# Patient Record
Sex: FEMALE | Race: WHITE | Employment: OTHER | URBAN - METROPOLITAN AREA
[De-identification: names, ages, dates, MRNs, and addresses within clinical notes are randomized per-mention and may not be internally consistent; named-entity substitution may affect disease eponyms.]

---

## 2017-01-11 PROBLEM — M06.4 INFLAMMATORY POLYARTHRITIS (HCC): Chronic | Status: ACTIVE | Noted: 2017-01-11

## 2017-01-11 PROBLEM — A41.9 SEPSIS (HCC): Status: ACTIVE | Noted: 2017-01-11

## 2018-05-17 ENCOUNTER — HOSPITAL ENCOUNTER (OUTPATIENT)
Age: 83
Discharge: HOME | End: 2018-05-19
Payer: MEDICARE

## 2018-05-17 LAB — RHEUMATOID FACTOR: 14 IU/ML (ref 0–13)

## 2018-05-17 PROCEDURE — 82787 IGG 1 2 3 OR 4 EACH: CPT

## 2018-05-17 PROCEDURE — 84165 PROTEIN E-PHORESIS SERUM: CPT

## 2018-05-17 PROCEDURE — 36415 COLL VENOUS BLD VENIPUNCTURE: CPT

## 2018-05-17 PROCEDURE — 82784 ASSAY IGA/IGD/IGG/IGM EACH: CPT

## 2018-05-17 PROCEDURE — 86431 RHEUMATOID FACTOR QUANT: CPT

## 2018-05-17 PROCEDURE — 86334 IMMUNOFIX E-PHORESIS SERUM: CPT

## 2018-05-17 PROCEDURE — 85651 RBC SED RATE NONAUTOMATED: CPT

## 2018-05-18 LAB — SEDIMENTATION RATE, ERYTHROCYTE: 76 MM/HR (ref 0–20)

## 2018-05-19 LAB
IGG 1: 447 MG/DL (ref 240–1118)
IGG 2: 464 MG/DL (ref 124–549)
IGG 3: 52 MG/DL (ref 21–134)
IGG 4: 163 MG/DL (ref 1–123)

## 2018-05-20 LAB
ALBUMIN SERPL-MCNC: 3 G/DL (ref 3.5–4.7)
ALPHA-1-GLOBULIN: 0.4 G/DL (ref 0.2–0.4)
ALPHA-2-GLOBULIN: 1 G/FL (ref 0.5–1)
BETA GLOBULIN: 1.1 G/DL (ref 0.8–1.3)
ELECTROPHORESIS: ABNORMAL
GAMMA GLOBULIN: 1.4 G/DL (ref 0.7–1.6)
IGM: 96 MG/DL (ref 40–230)
IMMUNOFIXATION RESULT, SERUM: NORMAL
TOTAL PROTEIN: 6.9 G/DL (ref 6.4–8.3)

## 2018-05-22 LAB
IGA 1: 404 MG/DL (ref 60–294)
IGA 2: 46 MG/DL (ref 6–61)
IGA: 464 MG/DL (ref 68–408)

## 2018-10-18 ENCOUNTER — HOSPITAL ENCOUNTER (OUTPATIENT)
Age: 83
Discharge: HOME | End: 2018-10-20
Payer: MEDICARE

## 2018-10-18 LAB
BASOPHILS ABSOLUTE: 0.03 E9/L (ref 0–0.2)
BASOPHILS RELATIVE PERCENT: 0.3 % (ref 0–2)
EOSINOPHILS ABSOLUTE: 0.03 E9/L (ref 0.05–0.5)
EOSINOPHILS RELATIVE PERCENT: 0.3 % (ref 0–6)
HCT VFR BLD CALC: 40.2 % (ref 34–48)
HEMOGLOBIN: 12.3 G/DL (ref 11.5–15.5)
IMMATURE GRANULOCYTES #: 0.1 E9/L
IMMATURE GRANULOCYTES %: 0.9 % (ref 0–5)
LYMPHOCYTES ABSOLUTE: 1.32 E9/L (ref 1.5–4)
LYMPHOCYTES RELATIVE PERCENT: 11.3 % (ref 20–42)
MCH RBC QN AUTO: 27.9 PG (ref 26–35)
MCHC RBC AUTO-ENTMCNC: 30.6 % (ref 32–34.5)
MCV RBC AUTO: 91.2 FL (ref 80–99.9)
MONOCYTES ABSOLUTE: 0.62 E9/L (ref 0.1–0.95)
MONOCYTES RELATIVE PERCENT: 5.3 % (ref 2–12)
NEUTROPHILS ABSOLUTE: 9.59 E9/L (ref 1.8–7.3)
NEUTROPHILS RELATIVE PERCENT: 81.9 % (ref 43–80)
PDW BLD-RTO: 15.2 FL (ref 11.5–15)
PLATELET # BLD: 320 E9/L (ref 130–450)
PMV BLD AUTO: 9 FL (ref 7–12)
RBC # BLD: 4.41 E12/L (ref 3.5–5.5)
WBC # BLD: 11.7 E9/L (ref 4.5–11.5)

## 2018-10-18 PROCEDURE — 85025 COMPLETE CBC W/AUTO DIFF WBC: CPT

## 2018-10-18 PROCEDURE — 36415 COLL VENOUS BLD VENIPUNCTURE: CPT

## 2018-10-18 PROCEDURE — 82787 IGG 1 2 3 OR 4 EACH: CPT

## 2018-10-18 PROCEDURE — 84165 PROTEIN E-PHORESIS SERUM: CPT

## 2018-10-18 PROCEDURE — 86334 IMMUNOFIX E-PHORESIS SERUM: CPT

## 2018-10-19 LAB
ALBUMIN SERPL-MCNC: 3.1 G/DL (ref 3.5–4.7)
ALPHA-1-GLOBULIN: 0.4 G/DL (ref 0.2–0.4)
ALPHA-2-GLOBULIN: 0.9 G/FL (ref 0.5–1)
BETA GLOBULIN: 1.2 G/DL (ref 0.8–1.3)
ELECTROPHORESIS: ABNORMAL
GAMMA GLOBULIN: 1.4 G/DL (ref 0.7–1.6)
IMMUNOFIXATION RESULT, SERUM: NORMAL
TOTAL PROTEIN: 7 G/DL (ref 6.4–8.3)

## 2018-10-20 LAB
IGG 1: 499 MG/DL (ref 240–1118)
IGG 2: 495 MG/DL (ref 124–549)
IGG 3: 53 MG/DL (ref 21–134)
IGG 4: 217 MG/DL (ref 1–123)

## 2019-02-21 ENCOUNTER — HOSPITAL ENCOUNTER (OUTPATIENT)
Age: 84
Discharge: HOME | End: 2019-02-23
Payer: MEDICARE

## 2019-02-21 LAB
ALBUMIN SERPL-MCNC: 2.9 G/DL (ref 3.5–5.2)
ALP BLD-CCNC: 67 U/L (ref 35–104)
ALT SERPL-CCNC: 9 U/L (ref 0–32)
ANION GAP SERPL CALCULATED.3IONS-SCNC: 13 MMOL/L (ref 7–16)
AST SERPL-CCNC: 14 U/L (ref 0–31)
BASOPHILS ABSOLUTE: 0.04 E9/L (ref 0–0.2)
BASOPHILS RELATIVE PERCENT: 0.3 % (ref 0–2)
BILIRUB SERPL-MCNC: 0.5 MG/DL (ref 0–1.2)
BUN BLDV-MCNC: 15 MG/DL (ref 8–23)
C-REACTIVE PROTEIN: 16 MG/DL (ref 0–0.4)
CALCIUM SERPL-MCNC: 8.9 MG/DL (ref 8.6–10.2)
CHLORIDE BLD-SCNC: 92 MMOL/L (ref 98–107)
CO2: 28 MMOL/L (ref 22–29)
CREAT SERPL-MCNC: 0.7 MG/DL (ref 0.5–1)
EOSINOPHILS ABSOLUTE: 0.1 E9/L (ref 0.05–0.5)
EOSINOPHILS RELATIVE PERCENT: 0.8 % (ref 0–6)
GFR AFRICAN AMERICAN: >60
GFR NON-AFRICAN AMERICAN: >60 ML/MIN/1.73
GLUCOSE BLD-MCNC: 138 MG/DL (ref 74–99)
HCT VFR BLD CALC: 39 % (ref 34–48)
HEMOGLOBIN: 12.1 G/DL (ref 11.5–15.5)
IMMATURE GRANULOCYTES #: 0.21 E9/L
IMMATURE GRANULOCYTES %: 1.6 % (ref 0–5)
LYMPHOCYTES ABSOLUTE: 1.08 E9/L (ref 1.5–4)
LYMPHOCYTES RELATIVE PERCENT: 8.2 % (ref 20–42)
MCH RBC QN AUTO: 28.2 PG (ref 26–35)
MCHC RBC AUTO-ENTMCNC: 31 % (ref 32–34.5)
MCV RBC AUTO: 90.9 FL (ref 80–99.9)
MONOCYTES ABSOLUTE: 0.68 E9/L (ref 0.1–0.95)
MONOCYTES RELATIVE PERCENT: 5.1 % (ref 2–12)
NEUTROPHILS ABSOLUTE: 11.12 E9/L (ref 1.8–7.3)
NEUTROPHILS RELATIVE PERCENT: 84 % (ref 43–80)
PDW BLD-RTO: 13.5 FL (ref 11.5–15)
PLATELET # BLD: 517 E9/L (ref 130–450)
PMV BLD AUTO: 9 FL (ref 7–12)
POTASSIUM SERPL-SCNC: 3.9 MMOL/L (ref 3.5–5)
RBC # BLD: 4.29 E12/L (ref 3.5–5.5)
SODIUM BLD-SCNC: 133 MMOL/L (ref 132–146)
WBC # BLD: 13.2 E9/L (ref 4.5–11.5)

## 2019-02-21 PROCEDURE — 84165 PROTEIN E-PHORESIS SERUM: CPT

## 2019-02-21 PROCEDURE — 82787 IGG 1 2 3 OR 4 EACH: CPT

## 2019-02-21 PROCEDURE — 86334 IMMUNOFIX E-PHORESIS SERUM: CPT

## 2019-02-21 PROCEDURE — 80053 COMPREHEN METABOLIC PANEL: CPT

## 2019-02-21 PROCEDURE — 85651 RBC SED RATE NONAUTOMATED: CPT

## 2019-02-21 PROCEDURE — 82784 ASSAY IGA/IGD/IGG/IGM EACH: CPT

## 2019-02-21 PROCEDURE — 36415 COLL VENOUS BLD VENIPUNCTURE: CPT

## 2019-02-21 PROCEDURE — 86140 C-REACTIVE PROTEIN: CPT

## 2019-02-21 PROCEDURE — 85025 COMPLETE CBC W/AUTO DIFF WBC: CPT

## 2019-02-22 LAB — SEDIMENTATION RATE, ERYTHROCYTE: 136 MM/HR (ref 0–20)

## 2019-02-23 ENCOUNTER — APPOINTMENT (OUTPATIENT)
Dept: CT IMAGING | Age: 84
DRG: 178 | End: 2019-02-23
Payer: MEDICARE

## 2019-02-23 ENCOUNTER — HOSPITAL ENCOUNTER (INPATIENT)
Age: 84
LOS: 13 days | Discharge: NURSING, REHAB OR LONG-TERM CARE | DRG: 178 | End: 2019-03-08
Attending: EMERGENCY MEDICINE | Admitting: INTERNAL MEDICINE
Payer: MEDICARE

## 2019-02-23 ENCOUNTER — APPOINTMENT (OUTPATIENT)
Dept: GENERAL RADIOLOGY | Age: 84
DRG: 178 | End: 2019-02-23
Payer: MEDICARE

## 2019-02-23 DIAGNOSIS — R53.83 FATIGUE, UNSPECIFIED TYPE: ICD-10-CM

## 2019-02-23 DIAGNOSIS — R05.9 COUGH: ICD-10-CM

## 2019-02-23 DIAGNOSIS — R91.8 MASS OF LOWER LOBE OF LEFT LUNG: Primary | ICD-10-CM

## 2019-02-23 DIAGNOSIS — E87.1 HYPONATREMIA: ICD-10-CM

## 2019-02-23 DIAGNOSIS — R91.8 RIGHT LOWER LOBE LUNG MASS: ICD-10-CM

## 2019-02-23 LAB
ALBUMIN SERPL-MCNC: 2.7 G/DL (ref 3.5–5.2)
ALP BLD-CCNC: 79 U/L (ref 35–104)
ALT SERPL-CCNC: 9 U/L (ref 0–32)
AMORPHOUS: ABNORMAL
ANION GAP SERPL CALCULATED.3IONS-SCNC: 10 MMOL/L (ref 7–16)
AST SERPL-CCNC: 12 U/L (ref 0–31)
ATYPICAL LYMPHOCYTE RELATIVE PERCENT: 1 % (ref 0–4)
BACTERIA: ABNORMAL /HPF
BASOPHILS ABSOLUTE: 0 E9/L (ref 0–0.2)
BASOPHILS RELATIVE PERCENT: 0 % (ref 0–2)
BILIRUB SERPL-MCNC: 0.4 MG/DL (ref 0–1.2)
BILIRUBIN URINE: NEGATIVE
BLOOD, URINE: ABNORMAL
BUN BLDV-MCNC: 12 MG/DL (ref 8–23)
CALCIUM SERPL-MCNC: 9.2 MG/DL (ref 8.6–10.2)
CHLORIDE BLD-SCNC: 90 MMOL/L (ref 98–107)
CLARITY: ABNORMAL
CO2: 31 MMOL/L (ref 22–29)
COLOR: YELLOW
CREAT SERPL-MCNC: 0.6 MG/DL (ref 0.5–1)
CRYSTALS, UA: ABNORMAL
EOSINOPHILS ABSOLUTE: 0.12 E9/L (ref 0.05–0.5)
EOSINOPHILS RELATIVE PERCENT: 1 % (ref 0–6)
EPITHELIAL CELLS, UA: ABNORMAL /HPF
GFR AFRICAN AMERICAN: >60
GFR NON-AFRICAN AMERICAN: >60 ML/MIN/1.73
GLUCOSE BLD-MCNC: 107 MG/DL (ref 74–99)
GLUCOSE URINE: NEGATIVE MG/DL
HCT VFR BLD CALC: 36.1 % (ref 34–48)
HEMOGLOBIN: 11.3 G/DL (ref 11.5–15.5)
IGG 1: 402 MG/DL (ref 240–1118)
IGG 2: 416 MG/DL (ref 124–549)
IGG 3: 31 MG/DL (ref 21–134)
IGG 4: 205 MG/DL (ref 1–123)
INFLUENZA A BY PCR: NOT DETECTED
INFLUENZA B BY PCR: NOT DETECTED
INR BLD: 1.7
KETONES, URINE: NEGATIVE MG/DL
LACTIC ACID: 2.6 MMOL/L (ref 0.5–2.2)
LEUKOCYTE ESTERASE, URINE: ABNORMAL
LIPASE: 30 U/L (ref 13–60)
LYMPHOCYTES ABSOLUTE: 0.12 E9/L (ref 1.5–4)
LYMPHOCYTES RELATIVE PERCENT: 0 % (ref 20–42)
MCH RBC QN AUTO: 28.5 PG (ref 26–35)
MCHC RBC AUTO-ENTMCNC: 31.3 % (ref 32–34.5)
MCV RBC AUTO: 91.2 FL (ref 80–99.9)
MONOCYTES ABSOLUTE: 0.47 E9/L (ref 0.1–0.95)
MONOCYTES RELATIVE PERCENT: 4 % (ref 2–12)
MUCUS: PRESENT
NEUTROPHILS ABSOLUTE: 11 E9/L (ref 1.8–7.3)
NEUTROPHILS RELATIVE PERCENT: 94 % (ref 43–80)
NITRITE, URINE: NEGATIVE
OSMOLALITY URINE: 402 MOSM/KG (ref 300–900)
PDW BLD-RTO: 13.4 FL (ref 11.5–15)
PH UA: 6 (ref 5–9)
PLATELET # BLD: 431 E9/L (ref 130–450)
PMV BLD AUTO: 8.7 FL (ref 7–12)
POTASSIUM REFLEX MAGNESIUM: 4.5 MMOL/L (ref 3.5–5)
PRO-BNP: 1120 PG/ML (ref 0–450)
PROTEIN UA: NEGATIVE MG/DL
PROTHROMBIN TIME: 19.8 SEC (ref 9.3–12.4)
RBC # BLD: 3.96 E12/L (ref 3.5–5.5)
RBC # BLD: NORMAL 10*6/UL
RBC UA: ABNORMAL /HPF (ref 0–2)
RENAL EPITHELIAL, UA: ABNORMAL /HPF
SODIUM BLD-SCNC: 131 MMOL/L (ref 132–146)
SODIUM URINE: <20 MMOL/L
SPECIFIC GRAVITY UA: <=1.005 (ref 1–1.03)
TOTAL PROTEIN: 6.8 G/DL (ref 6.4–8.3)
TROPONIN: <0.01 NG/ML (ref 0–0.03)
TSH SERPL DL<=0.05 MIU/L-ACNC: 2.67 UIU/ML (ref 0.27–4.2)
UROBILINOGEN, URINE: 2 E.U./DL
WBC # BLD: 11.7 E9/L (ref 4.5–11.5)
WBC UA: ABNORMAL /HPF (ref 0–5)

## 2019-02-23 PROCEDURE — 84484 ASSAY OF TROPONIN QUANT: CPT

## 2019-02-23 PROCEDURE — 71046 X-RAY EXAM CHEST 2 VIEWS: CPT

## 2019-02-23 PROCEDURE — 81001 URINALYSIS AUTO W/SCOPE: CPT

## 2019-02-23 PROCEDURE — 85025 COMPLETE CBC W/AUTO DIFF WBC: CPT

## 2019-02-23 PROCEDURE — 87081 CULTURE SCREEN ONLY: CPT

## 2019-02-23 PROCEDURE — 6370000000 HC RX 637 (ALT 250 FOR IP): Performed by: INTERNAL MEDICINE

## 2019-02-23 PROCEDURE — 80053 COMPREHEN METABOLIC PANEL: CPT

## 2019-02-23 PROCEDURE — 84300 ASSAY OF URINE SODIUM: CPT

## 2019-02-23 PROCEDURE — 87633 RESP VIRUS 12-25 TARGETS: CPT

## 2019-02-23 PROCEDURE — 83605 ASSAY OF LACTIC ACID: CPT

## 2019-02-23 PROCEDURE — 85610 PROTHROMBIN TIME: CPT

## 2019-02-23 PROCEDURE — 87088 URINE BACTERIA CULTURE: CPT

## 2019-02-23 PROCEDURE — 83935 ASSAY OF URINE OSMOLALITY: CPT

## 2019-02-23 PROCEDURE — 6360000002 HC RX W HCPCS: Performed by: INTERNAL MEDICINE

## 2019-02-23 PROCEDURE — 71250 CT THORAX DX C-: CPT

## 2019-02-23 PROCEDURE — 36415 COLL VENOUS BLD VENIPUNCTURE: CPT

## 2019-02-23 PROCEDURE — 99285 EMERGENCY DEPT VISIT HI MDM: CPT

## 2019-02-23 PROCEDURE — 2580000003 HC RX 258

## 2019-02-23 PROCEDURE — 87581 M.PNEUMON DNA AMP PROBE: CPT

## 2019-02-23 PROCEDURE — 87486 CHLMYD PNEUM DNA AMP PROBE: CPT

## 2019-02-23 PROCEDURE — 87502 INFLUENZA DNA AMP PROBE: CPT

## 2019-02-23 PROCEDURE — 2580000003 HC RX 258: Performed by: EMERGENCY MEDICINE

## 2019-02-23 PROCEDURE — 87798 DETECT AGENT NOS DNA AMP: CPT

## 2019-02-23 PROCEDURE — 84443 ASSAY THYROID STIM HORMONE: CPT

## 2019-02-23 PROCEDURE — 83880 ASSAY OF NATRIURETIC PEPTIDE: CPT

## 2019-02-23 PROCEDURE — 2580000003 HC RX 258: Performed by: INTERNAL MEDICINE

## 2019-02-23 PROCEDURE — 1200000000 HC SEMI PRIVATE

## 2019-02-23 PROCEDURE — 87040 BLOOD CULTURE FOR BACTERIA: CPT

## 2019-02-23 PROCEDURE — 83690 ASSAY OF LIPASE: CPT

## 2019-02-23 RX ORDER — 0.9 % SODIUM CHLORIDE 0.9 %
500 INTRAVENOUS SOLUTION INTRAVENOUS ONCE
Status: COMPLETED | OUTPATIENT
Start: 2019-02-23 | End: 2019-02-23

## 2019-02-23 RX ORDER — SODIUM CHLORIDE 9 MG/ML
INJECTION, SOLUTION INTRAVENOUS CONTINUOUS
Status: DISCONTINUED | OUTPATIENT
Start: 2019-02-23 | End: 2019-02-25

## 2019-02-23 RX ORDER — ACETAMINOPHEN 500 MG
500 TABLET ORAL DAILY
Status: DISCONTINUED | OUTPATIENT
Start: 2019-02-23 | End: 2019-03-08 | Stop reason: HOSPADM

## 2019-02-23 RX ORDER — CODEINE PHOSPHATE AND GUAIFENESIN 10; 100 MG/5ML; MG/5ML
10 SOLUTION ORAL EVERY 6 HOURS PRN
Status: DISCONTINUED | OUTPATIENT
Start: 2019-02-23 | End: 2019-03-08 | Stop reason: HOSPADM

## 2019-02-23 RX ORDER — CHOLECALCIFEROL (VITAMIN D3) 50 MCG
2000 TABLET ORAL DAILY
Status: DISCONTINUED | OUTPATIENT
Start: 2019-02-23 | End: 2019-03-08 | Stop reason: HOSPADM

## 2019-02-23 RX ORDER — AMOXICILLIN 500 MG/1
500 CAPSULE ORAL 3 TIMES DAILY
Status: ON HOLD | COMMUNITY
End: 2019-02-23

## 2019-02-23 RX ORDER — METOPROLOL SUCCINATE 25 MG/1
25 TABLET, EXTENDED RELEASE ORAL DAILY
Status: DISCONTINUED | OUTPATIENT
Start: 2019-02-23 | End: 2019-03-08 | Stop reason: HOSPADM

## 2019-02-23 RX ORDER — PREDNISONE 1 MG/1
2.5 TABLET ORAL DAILY
Status: DISCONTINUED | OUTPATIENT
Start: 2019-02-23 | End: 2019-03-01

## 2019-02-23 RX ORDER — LEVOTHYROXINE SODIUM 0.05 MG/1
50 TABLET ORAL DAILY
Status: DISCONTINUED | OUTPATIENT
Start: 2019-02-24 | End: 2019-03-08 | Stop reason: HOSPADM

## 2019-02-23 RX ORDER — SODIUM CHLORIDE 0.9 % (FLUSH) 0.9 %
SYRINGE (ML) INJECTION
Status: COMPLETED
Start: 2019-02-23 | End: 2019-02-23

## 2019-02-23 RX ORDER — LIDOCAINE HYDROCHLORIDE 10 MG/ML
5 INJECTION, SOLUTION EPIDURAL; INFILTRATION; INTRACAUDAL; PERINEURAL ONCE
Status: DISCONTINUED | OUTPATIENT
Start: 2019-02-23 | End: 2019-03-08 | Stop reason: HOSPADM

## 2019-02-23 RX ADMIN — GUAIFENESIN AND CODEINE PHOSPHATE 10 ML: 10; 100 LIQUID ORAL at 22:10

## 2019-02-23 RX ADMIN — SODIUM CHLORIDE: 9 INJECTION, SOLUTION INTRAVENOUS at 22:09

## 2019-02-23 RX ADMIN — VANCOMYCIN HYDROCHLORIDE 1000 MG: 1 INJECTION, POWDER, LYOPHILIZED, FOR SOLUTION INTRAVENOUS at 22:40

## 2019-02-23 RX ADMIN — Medication 10 ML: at 16:11

## 2019-02-23 RX ADMIN — SODIUM CHLORIDE 500 ML: 9 INJECTION, SOLUTION INTRAVENOUS at 16:31

## 2019-02-23 RX ADMIN — APIXABAN 2.5 MG: 2.5 TABLET, FILM COATED ORAL at 22:09

## 2019-02-23 RX ADMIN — CEFTRIAXONE 1 G: 1 INJECTION, POWDER, FOR SOLUTION INTRAMUSCULAR; INTRAVENOUS at 22:10

## 2019-02-23 ASSESSMENT — ENCOUNTER SYMPTOMS
DIARRHEA: 0
STRIDOR: 0
WHEEZING: 0
NAUSEA: 0
COUGH: 1
COLOR CHANGE: 0
CHOKING: 0
VOICE CHANGE: 0
EYE DISCHARGE: 0
HEMATOCHEZIA: 0
TROUBLE SWALLOWING: 0
VOMITING: 0
RHINORRHEA: 0
CHEST TIGHTNESS: 0
SHORTNESS OF BREATH: 0
ABDOMINAL DISTENTION: 0
SORE THROAT: 0
EYE ITCHING: 0
SINUS PRESSURE: 0
EYE REDNESS: 0
ABDOMINAL PAIN: 0

## 2019-02-23 ASSESSMENT — PAIN SCALES - GENERAL: PAINLEVEL_OUTOF10: 0

## 2019-02-24 ENCOUNTER — APPOINTMENT (OUTPATIENT)
Dept: CT IMAGING | Age: 84
DRG: 178 | End: 2019-02-24
Payer: MEDICARE

## 2019-02-24 LAB
ANION GAP SERPL CALCULATED.3IONS-SCNC: 12 MMOL/L (ref 7–16)
BASOPHILS ABSOLUTE: 0.02 E9/L (ref 0–0.2)
BASOPHILS RELATIVE PERCENT: 0.2 % (ref 0–2)
BUN BLDV-MCNC: 8 MG/DL (ref 8–23)
CALCIUM SERPL-MCNC: 8 MG/DL (ref 8.6–10.2)
CHLORIDE BLD-SCNC: 96 MMOL/L (ref 98–107)
CO2: 26 MMOL/L (ref 22–29)
CREAT SERPL-MCNC: 0.6 MG/DL (ref 0.5–1)
EOSINOPHILS ABSOLUTE: 0.12 E9/L (ref 0.05–0.5)
EOSINOPHILS RELATIVE PERCENT: 1.2 % (ref 0–6)
FILM ARRAY ADENOVIRUS: NORMAL
FILM ARRAY BORDETELLA PERTUSSIS: NORMAL
FILM ARRAY CHLAMYDOPHILIA PNEUMONIAE: NORMAL
FILM ARRAY CORONAVIRUS 229E: NORMAL
FILM ARRAY CORONAVIRUS HKU1: NORMAL
FILM ARRAY CORONAVIRUS NL63: NORMAL
FILM ARRAY CORONAVIRUS OC43: NORMAL
FILM ARRAY INFLUENZA A VIRUS 09H1: NORMAL
FILM ARRAY INFLUENZA A VIRUS H1: NORMAL
FILM ARRAY INFLUENZA A VIRUS H3: NORMAL
FILM ARRAY INFLUENZA A VIRUS: NORMAL
FILM ARRAY INFLUENZA B: NORMAL
FILM ARRAY METAPNEUMOVIRUS: NORMAL
FILM ARRAY MYCOPLASMA PNEUMONIAE: NORMAL
FILM ARRAY PARAINFLUENZA VIRUS 1: NORMAL
FILM ARRAY PARAINFLUENZA VIRUS 2: NORMAL
FILM ARRAY PARAINFLUENZA VIRUS 3: NORMAL
FILM ARRAY PARAINFLUENZA VIRUS 4: NORMAL
FILM ARRAY RESPIRATORY SYNCITIAL VIRUS: NORMAL
FILM ARRAY RHINOVIRUS/ENTEROVIRUS: NORMAL
GFR AFRICAN AMERICAN: >60
GFR NON-AFRICAN AMERICAN: >60 ML/MIN/1.73
GLUCOSE BLD-MCNC: 116 MG/DL (ref 74–99)
HCT VFR BLD CALC: 32.2 % (ref 34–48)
HEMOGLOBIN: 10.3 G/DL (ref 11.5–15.5)
IMMATURE GRANULOCYTES #: 0.16 E9/L
IMMATURE GRANULOCYTES %: 1.7 % (ref 0–5)
LYMPHOCYTES ABSOLUTE: 0.61 E9/L (ref 1.5–4)
LYMPHOCYTES RELATIVE PERCENT: 6.3 % (ref 20–42)
MCH RBC QN AUTO: 28.7 PG (ref 26–35)
MCHC RBC AUTO-ENTMCNC: 32 % (ref 32–34.5)
MCV RBC AUTO: 89.7 FL (ref 80–99.9)
METER GLUCOSE: 162 MG/DL (ref 74–99)
MONOCYTES ABSOLUTE: 0.73 E9/L (ref 0.1–0.95)
MONOCYTES RELATIVE PERCENT: 7.6 % (ref 2–12)
NEUTROPHILS ABSOLUTE: 8.02 E9/L (ref 1.8–7.3)
NEUTROPHILS RELATIVE PERCENT: 83 % (ref 43–80)
OSMOLALITY URINE: 211 MOSM/KG (ref 300–900)
OSMOLALITY: 271 MOSM/KG (ref 285–310)
OSMOLALITY: 283 MOSM/KG (ref 285–310)
PDW BLD-RTO: 13.5 FL (ref 11.5–15)
PLATELET # BLD: 403 E9/L (ref 130–450)
PMV BLD AUTO: 8.9 FL (ref 7–12)
POTASSIUM SERPL-SCNC: 3.5 MMOL/L (ref 3.5–5)
PROCALCITONIN: 0.06 NG/ML (ref 0–0.08)
RBC # BLD: 3.59 E12/L (ref 3.5–5.5)
SEDIMENTATION RATE, ERYTHROCYTE: 105 MM/HR (ref 0–20)
SODIUM BLD-SCNC: 134 MMOL/L (ref 132–146)
SODIUM URINE: <20 MMOL/L
WBC # BLD: 9.7 E9/L (ref 4.5–11.5)

## 2019-02-24 PROCEDURE — 1200000000 HC SEMI PRIVATE

## 2019-02-24 PROCEDURE — 84145 PROCALCITONIN (PCT): CPT

## 2019-02-24 PROCEDURE — 87449 NOS EACH ORGANISM AG IA: CPT

## 2019-02-24 PROCEDURE — 2580000003 HC RX 258: Performed by: INTERNAL MEDICINE

## 2019-02-24 PROCEDURE — 82962 GLUCOSE BLOOD TEST: CPT

## 2019-02-24 PROCEDURE — 87450 HC DIRECT STREP B ANTIGEN: CPT

## 2019-02-24 PROCEDURE — 86255 FLUORESCENT ANTIBODY SCREEN: CPT

## 2019-02-24 PROCEDURE — 2580000003 HC RX 258: Performed by: SPECIALIST

## 2019-02-24 PROCEDURE — 36415 COLL VENOUS BLD VENIPUNCTURE: CPT

## 2019-02-24 PROCEDURE — 83930 ASSAY OF BLOOD OSMOLALITY: CPT

## 2019-02-24 PROCEDURE — 6360000002 HC RX W HCPCS: Performed by: SPECIALIST

## 2019-02-24 PROCEDURE — 87305 ASPERGILLUS AG IA: CPT

## 2019-02-24 PROCEDURE — 83516 IMMUNOASSAY NONANTIBODY: CPT

## 2019-02-24 PROCEDURE — 6370000000 HC RX 637 (ALT 250 FOR IP): Performed by: INTERNAL MEDICINE

## 2019-02-24 PROCEDURE — 70486 CT MAXILLOFACIAL W/O DYE: CPT

## 2019-02-24 PROCEDURE — 80048 BASIC METABOLIC PNL TOTAL CA: CPT

## 2019-02-24 PROCEDURE — 83935 ASSAY OF URINE OSMOLALITY: CPT

## 2019-02-24 PROCEDURE — 84300 ASSAY OF URINE SODIUM: CPT

## 2019-02-24 PROCEDURE — 85025 COMPLETE CBC W/AUTO DIFF WBC: CPT

## 2019-02-24 PROCEDURE — 85651 RBC SED RATE NONAUTOMATED: CPT

## 2019-02-24 RX ORDER — ACETAMINOPHEN 325 MG/1
650 TABLET ORAL EVERY 6 HOURS PRN
Status: DISCONTINUED | OUTPATIENT
Start: 2019-02-24 | End: 2019-03-08 | Stop reason: HOSPADM

## 2019-02-24 RX ADMIN — CHOLECALCIFEROL TAB 50 MCG (2000 UNIT) 2000 UNITS: 50 TAB at 09:08

## 2019-02-24 RX ADMIN — GUAIFENESIN AND CODEINE PHOSPHATE 10 ML: 10; 100 LIQUID ORAL at 12:06

## 2019-02-24 RX ADMIN — SODIUM CHLORIDE: 9 INJECTION, SOLUTION INTRAVENOUS at 20:16

## 2019-02-24 RX ADMIN — LEVOTHYROXINE SODIUM 50 MCG: 50 TABLET ORAL at 09:08

## 2019-02-24 RX ADMIN — APIXABAN 2.5 MG: 2.5 TABLET, FILM COATED ORAL at 09:08

## 2019-02-24 RX ADMIN — AMPICILLIN SODIUM AND SULBACTAM SODIUM 3 G: 2; 1 INJECTION, POWDER, FOR SOLUTION INTRAMUSCULAR; INTRAVENOUS at 16:33

## 2019-02-24 RX ADMIN — APIXABAN 2.5 MG: 2.5 TABLET, FILM COATED ORAL at 20:16

## 2019-02-24 RX ADMIN — SODIUM CHLORIDE: 9 INJECTION, SOLUTION INTRAVENOUS at 09:13

## 2019-02-24 RX ADMIN — ACETAMINOPHEN 650 MG: 325 TABLET ORAL at 21:49

## 2019-02-24 RX ADMIN — METOPROLOL SUCCINATE 25 MG: 25 TABLET, EXTENDED RELEASE ORAL at 09:08

## 2019-02-24 RX ADMIN — AMPICILLIN SODIUM AND SULBACTAM SODIUM 3 G: 2; 1 INJECTION, POWDER, FOR SOLUTION INTRAMUSCULAR; INTRAVENOUS at 21:53

## 2019-02-24 RX ADMIN — PREDNISONE 2.5 MG: 5 TABLET ORAL at 09:08

## 2019-02-24 RX ADMIN — ACETAMINOPHEN 500 MG: 500 TABLET ORAL at 09:08

## 2019-02-24 ASSESSMENT — PAIN SCALES - GENERAL
PAINLEVEL_OUTOF10: 0
PAINLEVEL_OUTOF10: 0
PAINLEVEL_OUTOF10: 3
PAINLEVEL_OUTOF10: 0
PAINLEVEL_OUTOF10: 0

## 2019-02-24 ASSESSMENT — ENCOUNTER SYMPTOMS
ABDOMINAL PAIN: 0
COUGH: 1
EYE PAIN: 0
SHORTNESS OF BREATH: 0
SORE THROAT: 0
ABDOMINAL DISTENTION: 0
CHEST TIGHTNESS: 0
EYE DISCHARGE: 0
RHINORRHEA: 1
EYE ITCHING: 0
TROUBLE SWALLOWING: 0

## 2019-02-24 ASSESSMENT — PAIN - FUNCTIONAL ASSESSMENT: PAIN_FUNCTIONAL_ASSESSMENT: ACTIVITIES ARE NOT PREVENTED

## 2019-02-24 ASSESSMENT — PAIN DESCRIPTION - DESCRIPTORS: DESCRIPTORS: HEADACHE

## 2019-02-24 ASSESSMENT — PAIN DESCRIPTION - PAIN TYPE: TYPE: ACUTE PAIN

## 2019-02-24 ASSESSMENT — PAIN DESCRIPTION - FREQUENCY: FREQUENCY: INTERMITTENT

## 2019-02-24 ASSESSMENT — PAIN DESCRIPTION - LOCATION: LOCATION: HEAD

## 2019-02-24 ASSESSMENT — PAIN DESCRIPTION - ONSET: ONSET: GRADUAL

## 2019-02-24 ASSESSMENT — PAIN DESCRIPTION - PROGRESSION: CLINICAL_PROGRESSION: GRADUALLY WORSENING

## 2019-02-25 ENCOUNTER — APPOINTMENT (OUTPATIENT)
Dept: GENERAL RADIOLOGY | Age: 84
DRG: 178 | End: 2019-02-25
Payer: MEDICARE

## 2019-02-25 LAB
ALBUMIN SERPL-MCNC: 2.4 G/DL (ref 3.5–4.7)
ALPHA-1-GLOBULIN: 0.6 G/DL (ref 0.2–0.4)
ALPHA-2-GLOBULIN: 1.1 G/FL (ref 0.5–1)
ANION GAP SERPL CALCULATED.3IONS-SCNC: 8 MMOL/L (ref 7–16)
BETA GLOBULIN: 1.3 G/DL (ref 0.8–1.3)
BUN BLDV-MCNC: 7 MG/DL (ref 8–23)
CALCIUM SERPL-MCNC: 8.2 MG/DL (ref 8.6–10.2)
CHLORIDE BLD-SCNC: 102 MMOL/L (ref 98–107)
CO2: 27 MMOL/L (ref 22–29)
CREAT SERPL-MCNC: 0.6 MG/DL (ref 0.5–1)
ELECTROPHORESIS: ABNORMAL
GAMMA GLOBULIN: 1.1 G/DL (ref 0.7–1.6)
GFR AFRICAN AMERICAN: >60
GFR NON-AFRICAN AMERICAN: >60 ML/MIN/1.73
GLUCOSE BLD-MCNC: 109 MG/DL (ref 74–99)
IGA: 555 MG/DL (ref 70–400)
IGM: 75 MG/DL (ref 40–230)
IMMUNOFIXATION RESULT, SERUM: NORMAL
L. PNEUMOPHILA SEROGP 1 UR AG: NORMAL
MRSA CULTURE ONLY: NORMAL
POTASSIUM SERPL-SCNC: 3.6 MMOL/L (ref 3.5–5)
SEDIMENTATION RATE, ERYTHROCYTE: 110 MM/HR (ref 0–20)
SODIUM BLD-SCNC: 137 MMOL/L (ref 132–146)
STREP PNEUMONIAE ANTIGEN, URINE: NORMAL
TOTAL PROTEIN: 6.4 G/DL (ref 6.4–8.3)
URINE CULTURE, ROUTINE: NORMAL

## 2019-02-25 PROCEDURE — 36415 COLL VENOUS BLD VENIPUNCTURE: CPT

## 2019-02-25 PROCEDURE — 85651 RBC SED RATE NONAUTOMATED: CPT

## 2019-02-25 PROCEDURE — 2580000003 HC RX 258: Performed by: SPECIALIST

## 2019-02-25 PROCEDURE — 92611 MOTION FLUOROSCOPY/SWALLOW: CPT | Performed by: SPEECH-LANGUAGE PATHOLOGIST

## 2019-02-25 PROCEDURE — 6370000000 HC RX 637 (ALT 250 FOR IP): Performed by: INTERNAL MEDICINE

## 2019-02-25 PROCEDURE — 97165 OT EVAL LOW COMPLEX 30 MIN: CPT

## 2019-02-25 PROCEDURE — 74230 X-RAY XM SWLNG FUNCJ C+: CPT

## 2019-02-25 PROCEDURE — 2580000003 HC RX 258: Performed by: INTERNAL MEDICINE

## 2019-02-25 PROCEDURE — 6360000002 HC RX W HCPCS: Performed by: SPECIALIST

## 2019-02-25 PROCEDURE — 1200000000 HC SEMI PRIVATE

## 2019-02-25 PROCEDURE — 80048 BASIC METABOLIC PNL TOTAL CA: CPT

## 2019-02-25 PROCEDURE — 2500000003 HC RX 250 WO HCPCS: Performed by: RADIOLOGY

## 2019-02-25 RX ORDER — SODIUM CHLORIDE 9 MG/ML
INJECTION, SOLUTION INTRAVENOUS CONTINUOUS
Status: ACTIVE | OUTPATIENT
Start: 2019-02-25 | End: 2019-02-26

## 2019-02-25 RX ADMIN — BARIUM SULFATE 45 G: 0.6 CREAM ORAL at 11:22

## 2019-02-25 RX ADMIN — ACETAMINOPHEN 500 MG: 500 TABLET ORAL at 10:25

## 2019-02-25 RX ADMIN — METOPROLOL SUCCINATE 25 MG: 25 TABLET, EXTENDED RELEASE ORAL at 10:24

## 2019-02-25 RX ADMIN — BARIUM SULFATE 88 G: 960 POWDER, FOR SUSPENSION ORAL at 11:22

## 2019-02-25 RX ADMIN — SODIUM CHLORIDE: 9 INJECTION, SOLUTION INTRAVENOUS at 10:58

## 2019-02-25 RX ADMIN — APIXABAN 2.5 MG: 2.5 TABLET, FILM COATED ORAL at 21:30

## 2019-02-25 RX ADMIN — AMPICILLIN SODIUM AND SULBACTAM SODIUM 3 G: 2; 1 INJECTION, POWDER, FOR SOLUTION INTRAMUSCULAR; INTRAVENOUS at 06:09

## 2019-02-25 RX ADMIN — AMPICILLIN SODIUM AND SULBACTAM SODIUM 3 G: 2; 1 INJECTION, POWDER, FOR SOLUTION INTRAMUSCULAR; INTRAVENOUS at 22:46

## 2019-02-25 RX ADMIN — GUAIFENESIN AND CODEINE PHOSPHATE 10 ML: 10; 100 LIQUID ORAL at 18:28

## 2019-02-25 RX ADMIN — AMPICILLIN SODIUM AND SULBACTAM SODIUM 3 G: 2; 1 INJECTION, POWDER, FOR SOLUTION INTRAMUSCULAR; INTRAVENOUS at 10:29

## 2019-02-25 RX ADMIN — PREDNISONE 2.5 MG: 5 TABLET ORAL at 10:24

## 2019-02-25 RX ADMIN — CHOLECALCIFEROL TAB 50 MCG (2000 UNIT) 2000 UNITS: 50 TAB at 10:24

## 2019-02-25 RX ADMIN — APIXABAN 2.5 MG: 2.5 TABLET, FILM COATED ORAL at 10:24

## 2019-02-25 RX ADMIN — AMPICILLIN SODIUM AND SULBACTAM SODIUM 3 G: 2; 1 INJECTION, POWDER, FOR SOLUTION INTRAMUSCULAR; INTRAVENOUS at 17:07

## 2019-02-25 RX ADMIN — SODIUM CHLORIDE: 9 INJECTION, SOLUTION INTRAVENOUS at 17:07

## 2019-02-25 RX ADMIN — LEVOTHYROXINE SODIUM 50 MCG: 50 TABLET ORAL at 06:09

## 2019-02-25 RX ADMIN — ACETAMINOPHEN 650 MG: 325 TABLET ORAL at 06:09

## 2019-02-25 ASSESSMENT — PAIN SCALES - GENERAL
PAINLEVEL_OUTOF10: 3
PAINLEVEL_OUTOF10: 0

## 2019-02-26 PROBLEM — J69.0 ASPIRATION PNEUMONIA (HCC): Status: ACTIVE | Noted: 2019-02-26

## 2019-02-26 PROBLEM — A41.9 SEPSIS (HCC): Status: RESOLVED | Noted: 2017-01-11 | Resolved: 2019-02-26

## 2019-02-26 LAB
ANION GAP SERPL CALCULATED.3IONS-SCNC: 8 MMOL/L (ref 7–16)
ASPERGILLUS GALACTO AG: NEGATIVE
ASPERGILLUS GALACTO INDEX: 0.03
BUN BLDV-MCNC: 5 MG/DL (ref 8–23)
CALCIUM SERPL-MCNC: 7.9 MG/DL (ref 8.6–10.2)
CHLORIDE BLD-SCNC: 100 MMOL/L (ref 98–107)
CO2: 27 MMOL/L (ref 22–29)
CREAT SERPL-MCNC: 0.5 MG/DL (ref 0.5–1)
GFR AFRICAN AMERICAN: >60
GFR NON-AFRICAN AMERICAN: >60 ML/MIN/1.73
GLUCOSE BLD-MCNC: 116 MG/DL (ref 74–99)
POTASSIUM SERPL-SCNC: 3.3 MMOL/L (ref 3.5–5)
SODIUM BLD-SCNC: 135 MMOL/L (ref 132–146)

## 2019-02-26 PROCEDURE — 36415 COLL VENOUS BLD VENIPUNCTURE: CPT

## 2019-02-26 PROCEDURE — 1200000000 HC SEMI PRIVATE

## 2019-02-26 PROCEDURE — 2580000003 HC RX 258

## 2019-02-26 PROCEDURE — 80048 BASIC METABOLIC PNL TOTAL CA: CPT

## 2019-02-26 PROCEDURE — 97161 PT EVAL LOW COMPLEX 20 MIN: CPT

## 2019-02-26 PROCEDURE — 6370000000 HC RX 637 (ALT 250 FOR IP): Performed by: INTERNAL MEDICINE

## 2019-02-26 PROCEDURE — 97530 THERAPEUTIC ACTIVITIES: CPT

## 2019-02-26 PROCEDURE — 2580000003 HC RX 258: Performed by: INTERNAL MEDICINE

## 2019-02-26 PROCEDURE — 6360000002 HC RX W HCPCS: Performed by: SPECIALIST

## 2019-02-26 PROCEDURE — 2580000003 HC RX 258: Performed by: SPECIALIST

## 2019-02-26 RX ORDER — POTASSIUM CHLORIDE 20 MEQ/1
20 TABLET, EXTENDED RELEASE ORAL 2 TIMES DAILY WITH MEALS
Status: COMPLETED | OUTPATIENT
Start: 2019-02-26 | End: 2019-02-26

## 2019-02-26 RX ORDER — SODIUM CHLORIDE 0.9 % (FLUSH) 0.9 %
SYRINGE (ML) INJECTION
Status: COMPLETED
Start: 2019-02-26 | End: 2019-02-26

## 2019-02-26 RX ADMIN — PREDNISONE 2.5 MG: 5 TABLET ORAL at 08:20

## 2019-02-26 RX ADMIN — APIXABAN 2.5 MG: 2.5 TABLET, FILM COATED ORAL at 08:21

## 2019-02-26 RX ADMIN — SODIUM CHLORIDE, PRESERVATIVE FREE: 5 INJECTION INTRAVENOUS at 10:15

## 2019-02-26 RX ADMIN — APIXABAN 2.5 MG: 2.5 TABLET, FILM COATED ORAL at 22:15

## 2019-02-26 RX ADMIN — LEVOTHYROXINE SODIUM 50 MCG: 50 TABLET ORAL at 06:17

## 2019-02-26 RX ADMIN — AMPICILLIN SODIUM AND SULBACTAM SODIUM 3 G: 2; 1 INJECTION, POWDER, FOR SOLUTION INTRAMUSCULAR; INTRAVENOUS at 16:45

## 2019-02-26 RX ADMIN — METOPROLOL SUCCINATE 25 MG: 25 TABLET, EXTENDED RELEASE ORAL at 08:20

## 2019-02-26 RX ADMIN — AMPICILLIN SODIUM AND SULBACTAM SODIUM 3 G: 2; 1 INJECTION, POWDER, FOR SOLUTION INTRAMUSCULAR; INTRAVENOUS at 22:15

## 2019-02-26 RX ADMIN — POTASSIUM CHLORIDE 20 MEQ: 20 TABLET, EXTENDED RELEASE ORAL at 16:45

## 2019-02-26 RX ADMIN — CHOLECALCIFEROL TAB 50 MCG (2000 UNIT) 2000 UNITS: 50 TAB at 08:19

## 2019-02-26 RX ADMIN — ACETAMINOPHEN 500 MG: 500 TABLET ORAL at 08:19

## 2019-02-26 RX ADMIN — GUAIFENESIN AND CODEINE PHOSPHATE 10 ML: 10; 100 LIQUID ORAL at 13:47

## 2019-02-26 RX ADMIN — AMPICILLIN SODIUM AND SULBACTAM SODIUM 3 G: 2; 1 INJECTION, POWDER, FOR SOLUTION INTRAMUSCULAR; INTRAVENOUS at 10:51

## 2019-02-26 RX ADMIN — AMPICILLIN SODIUM AND SULBACTAM SODIUM 3 G: 2; 1 INJECTION, POWDER, FOR SOLUTION INTRAMUSCULAR; INTRAVENOUS at 04:21

## 2019-02-26 RX ADMIN — POTASSIUM CHLORIDE 20 MEQ: 20 TABLET, EXTENDED RELEASE ORAL at 08:21

## 2019-02-26 RX ADMIN — SODIUM CHLORIDE: 9 INJECTION, SOLUTION INTRAVENOUS at 04:13

## 2019-02-26 ASSESSMENT — PAIN DESCRIPTION - PAIN TYPE: TYPE: CHRONIC PAIN

## 2019-02-26 ASSESSMENT — PAIN - FUNCTIONAL ASSESSMENT
PAIN_FUNCTIONAL_ASSESSMENT: PREVENTS OR INTERFERES SOME ACTIVE ACTIVITIES AND ADLS
PAIN_FUNCTIONAL_ASSESSMENT: 0-10

## 2019-02-26 ASSESSMENT — PAIN DESCRIPTION - DESCRIPTORS: DESCRIPTORS: ACHING

## 2019-02-26 ASSESSMENT — PAIN SCALES - GENERAL
PAINLEVEL_OUTOF10: 2
PAINLEVEL_OUTOF10: 0

## 2019-02-26 ASSESSMENT — PAIN DESCRIPTION - LOCATION: LOCATION: GENERALIZED

## 2019-02-27 LAB
(1,3)-BETA-D-GLUCAN (FUNGITELL) INTERPRETATION: POSITIVE
(1,3)-BETA-D-GLUCAN (FUNGITELL): 105 PG/ML
ANION GAP SERPL CALCULATED.3IONS-SCNC: 7 MMOL/L (ref 7–16)
BUN BLDV-MCNC: 9 MG/DL (ref 8–23)
CALCIUM SERPL-MCNC: 8.3 MG/DL (ref 8.6–10.2)
CHLORIDE BLD-SCNC: 100 MMOL/L (ref 98–107)
CO2: 30 MMOL/L (ref 22–29)
CREAT SERPL-MCNC: 0.5 MG/DL (ref 0.5–1)
GFR AFRICAN AMERICAN: >60
GFR NON-AFRICAN AMERICAN: >60 ML/MIN/1.73
GLUCOSE BLD-MCNC: 130 MG/DL (ref 74–99)
HCT VFR BLD CALC: 30.4 % (ref 34–48)
HEMOGLOBIN: 9.9 G/DL (ref 11.5–15.5)
MCH RBC QN AUTO: 29.1 PG (ref 26–35)
MCHC RBC AUTO-ENTMCNC: 32.6 % (ref 32–34.5)
MCV RBC AUTO: 89.4 FL (ref 80–99.9)
PDW BLD-RTO: 13.9 FL (ref 11.5–15)
PLATELET # BLD: 391 E9/L (ref 130–450)
PMV BLD AUTO: 8.8 FL (ref 7–12)
POTASSIUM SERPL-SCNC: 4.4 MMOL/L (ref 3.5–5)
RBC # BLD: 3.4 E12/L (ref 3.5–5.5)
SODIUM BLD-SCNC: 137 MMOL/L (ref 132–146)
WBC # BLD: 9.8 E9/L (ref 4.5–11.5)

## 2019-02-27 PROCEDURE — 2580000003 HC RX 258: Performed by: SPECIALIST

## 2019-02-27 PROCEDURE — 83516 IMMUNOASSAY NONANTIBODY: CPT

## 2019-02-27 PROCEDURE — 85027 COMPLETE CBC AUTOMATED: CPT

## 2019-02-27 PROCEDURE — 1200000000 HC SEMI PRIVATE

## 2019-02-27 PROCEDURE — 2580000003 HC RX 258

## 2019-02-27 PROCEDURE — 6370000000 HC RX 637 (ALT 250 FOR IP): Performed by: CLINICAL NURSE SPECIALIST

## 2019-02-27 PROCEDURE — 6360000002 HC RX W HCPCS: Performed by: SPECIALIST

## 2019-02-27 PROCEDURE — 97530 THERAPEUTIC ACTIVITIES: CPT

## 2019-02-27 PROCEDURE — 86039 ANTINUCLEAR ANTIBODIES (ANA): CPT

## 2019-02-27 PROCEDURE — 86255 FLUORESCENT ANTIBODY SCREEN: CPT

## 2019-02-27 PROCEDURE — 2580000003 HC RX 258: Performed by: INTERNAL MEDICINE

## 2019-02-27 PROCEDURE — 36415 COLL VENOUS BLD VENIPUNCTURE: CPT

## 2019-02-27 PROCEDURE — 92526 ORAL FUNCTION THERAPY: CPT | Performed by: SPEECH-LANGUAGE PATHOLOGIST

## 2019-02-27 PROCEDURE — 6370000000 HC RX 637 (ALT 250 FOR IP): Performed by: INTERNAL MEDICINE

## 2019-02-27 PROCEDURE — 80048 BASIC METABOLIC PNL TOTAL CA: CPT

## 2019-02-27 PROCEDURE — 86038 ANTINUCLEAR ANTIBODIES: CPT

## 2019-02-27 RX ORDER — SODIUM CHLORIDE 0.9 % (FLUSH) 0.9 %
SYRINGE (ML) INJECTION
Status: COMPLETED
Start: 2019-02-27 | End: 2019-02-27

## 2019-02-27 RX ORDER — SODIUM CHLORIDE 0.9 % (FLUSH) 0.9 %
10 SYRINGE (ML) INJECTION 2 TIMES DAILY
Status: DISCONTINUED | OUTPATIENT
Start: 2019-02-27 | End: 2019-03-08 | Stop reason: HOSPADM

## 2019-02-27 RX ORDER — FLUTICASONE PROPIONATE 50 MCG
1 SPRAY, SUSPENSION (ML) NASAL DAILY
Status: DISCONTINUED | OUTPATIENT
Start: 2019-02-27 | End: 2019-03-08 | Stop reason: HOSPADM

## 2019-02-27 RX ADMIN — Medication 10 ML: at 12:57

## 2019-02-27 RX ADMIN — LEVOTHYROXINE SODIUM 50 MCG: 50 TABLET ORAL at 05:01

## 2019-02-27 RX ADMIN — CHOLECALCIFEROL TAB 50 MCG (2000 UNIT) 2000 UNITS: 50 TAB at 08:32

## 2019-02-27 RX ADMIN — AMPICILLIN SODIUM AND SULBACTAM SODIUM 3 G: 2; 1 INJECTION, POWDER, FOR SOLUTION INTRAMUSCULAR; INTRAVENOUS at 16:28

## 2019-02-27 RX ADMIN — APIXABAN 2.5 MG: 2.5 TABLET, FILM COATED ORAL at 08:31

## 2019-02-27 RX ADMIN — AMPICILLIN SODIUM AND SULBACTAM SODIUM 3 G: 2; 1 INJECTION, POWDER, FOR SOLUTION INTRAMUSCULAR; INTRAVENOUS at 05:00

## 2019-02-27 RX ADMIN — SODIUM CHLORIDE, PRESERVATIVE FREE: 5 INJECTION INTRAVENOUS at 11:40

## 2019-02-27 RX ADMIN — FLUTICASONE PROPIONATE 1 SPRAY: 50 SPRAY, METERED NASAL at 12:55

## 2019-02-27 RX ADMIN — ACETAMINOPHEN 500 MG: 500 TABLET ORAL at 10:26

## 2019-02-27 RX ADMIN — PREDNISONE 2.5 MG: 5 TABLET ORAL at 08:32

## 2019-02-27 RX ADMIN — Medication 10 ML: at 11:00

## 2019-02-27 RX ADMIN — METOPROLOL SUCCINATE 25 MG: 25 TABLET, EXTENDED RELEASE ORAL at 08:31

## 2019-02-27 RX ADMIN — AMPICILLIN SODIUM AND SULBACTAM SODIUM 3 G: 2; 1 INJECTION, POWDER, FOR SOLUTION INTRAMUSCULAR; INTRAVENOUS at 21:57

## 2019-02-27 RX ADMIN — GUAIFENESIN AND CODEINE PHOSPHATE 10 ML: 10; 100 LIQUID ORAL at 16:28

## 2019-02-27 RX ADMIN — AMPICILLIN SODIUM AND SULBACTAM SODIUM 3 G: 2; 1 INJECTION, POWDER, FOR SOLUTION INTRAMUSCULAR; INTRAVENOUS at 10:27

## 2019-02-27 RX ADMIN — APIXABAN 2.5 MG: 2.5 TABLET, FILM COATED ORAL at 20:48

## 2019-02-27 RX ADMIN — Medication 10 ML: at 20:49

## 2019-02-27 ASSESSMENT — PAIN SCALES - GENERAL
PAINLEVEL_OUTOF10: 0

## 2019-02-28 LAB
ANCA IFA: ABNORMAL
ANION GAP SERPL CALCULATED.3IONS-SCNC: 11 MMOL/L (ref 7–16)
BLOOD CULTURE, ROUTINE: NORMAL
BUN BLDV-MCNC: 7 MG/DL (ref 8–23)
CALCIUM SERPL-MCNC: 7.8 MG/DL (ref 8.6–10.2)
CHLORIDE BLD-SCNC: 94 MMOL/L (ref 98–107)
CO2: 29 MMOL/L (ref 22–29)
CREAT SERPL-MCNC: 0.5 MG/DL (ref 0.5–1)
CULTURE, BLOOD 2: NORMAL
EKG ATRIAL RATE: 96 BPM
EKG P AXIS: 34 DEGREES
EKG P-R INTERVAL: 160 MS
EKG Q-T INTERVAL: 364 MS
EKG QRS DURATION: 78 MS
EKG QTC CALCULATION (BAZETT): 459 MS
EKG R AXIS: -12 DEGREES
EKG T AXIS: 14 DEGREES
EKG VENTRICULAR RATE: 96 BPM
GFR AFRICAN AMERICAN: >60
GFR NON-AFRICAN AMERICAN: >60 ML/MIN/1.73
GLUCOSE BLD-MCNC: 114 MG/DL (ref 74–99)
HCT VFR BLD CALC: 31.2 % (ref 34–48)
HEMOGLOBIN: 9.8 G/DL (ref 11.5–15.5)
MCH RBC QN AUTO: 28.2 PG (ref 26–35)
MCHC RBC AUTO-ENTMCNC: 31.4 % (ref 32–34.5)
MCV RBC AUTO: 89.7 FL (ref 80–99.9)
PDW BLD-RTO: 13.9 FL (ref 11.5–15)
PLATELET # BLD: 368 E9/L (ref 130–450)
PMV BLD AUTO: 8.7 FL (ref 7–12)
POTASSIUM SERPL-SCNC: 3.6 MMOL/L (ref 3.5–5)
RBC # BLD: 3.48 E12/L (ref 3.5–5.5)
SODIUM BLD-SCNC: 134 MMOL/L (ref 132–146)
WBC # BLD: 9.7 E9/L (ref 4.5–11.5)

## 2019-02-28 PROCEDURE — 6370000000 HC RX 637 (ALT 250 FOR IP): Performed by: SPECIALIST

## 2019-02-28 PROCEDURE — 36415 COLL VENOUS BLD VENIPUNCTURE: CPT

## 2019-02-28 PROCEDURE — 2580000003 HC RX 258: Performed by: SPECIALIST

## 2019-02-28 PROCEDURE — 1200000000 HC SEMI PRIVATE

## 2019-02-28 PROCEDURE — 85027 COMPLETE CBC AUTOMATED: CPT

## 2019-02-28 PROCEDURE — 6370000000 HC RX 637 (ALT 250 FOR IP): Performed by: INTERNAL MEDICINE

## 2019-02-28 PROCEDURE — 80048 BASIC METABOLIC PNL TOTAL CA: CPT

## 2019-02-28 PROCEDURE — 92526 ORAL FUNCTION THERAPY: CPT | Performed by: SPEECH-LANGUAGE PATHOLOGIST

## 2019-02-28 PROCEDURE — 2580000003 HC RX 258: Performed by: INTERNAL MEDICINE

## 2019-02-28 PROCEDURE — 97535 SELF CARE MNGMENT TRAINING: CPT

## 2019-02-28 PROCEDURE — 97530 THERAPEUTIC ACTIVITIES: CPT

## 2019-02-28 PROCEDURE — 6360000002 HC RX W HCPCS: Performed by: SPECIALIST

## 2019-02-28 RX ORDER — ITRACONAZOLE 100 MG/1
200 CAPSULE ORAL 2 TIMES DAILY
Status: DISCONTINUED | OUTPATIENT
Start: 2019-02-28 | End: 2019-03-08 | Stop reason: HOSPADM

## 2019-02-28 RX ADMIN — AMPICILLIN SODIUM AND SULBACTAM SODIUM 3 G: 2; 1 INJECTION, POWDER, FOR SOLUTION INTRAMUSCULAR; INTRAVENOUS at 05:00

## 2019-02-28 RX ADMIN — LEVOTHYROXINE SODIUM 50 MCG: 50 TABLET ORAL at 05:02

## 2019-02-28 RX ADMIN — AMPICILLIN SODIUM AND SULBACTAM SODIUM 3 G: 2; 1 INJECTION, POWDER, FOR SOLUTION INTRAMUSCULAR; INTRAVENOUS at 22:05

## 2019-02-28 RX ADMIN — CHOLECALCIFEROL TAB 50 MCG (2000 UNIT) 2000 UNITS: 50 TAB at 10:23

## 2019-02-28 RX ADMIN — FLUTICASONE PROPIONATE 1 SPRAY: 50 SPRAY, METERED NASAL at 10:22

## 2019-02-28 RX ADMIN — APIXABAN 2.5 MG: 2.5 TABLET, FILM COATED ORAL at 10:24

## 2019-02-28 RX ADMIN — ACETAMINOPHEN 500 MG: 500 TABLET ORAL at 10:22

## 2019-02-28 RX ADMIN — PREDNISONE 2.5 MG: 5 TABLET ORAL at 10:23

## 2019-02-28 RX ADMIN — AMPICILLIN SODIUM AND SULBACTAM SODIUM 3 G: 2; 1 INJECTION, POWDER, FOR SOLUTION INTRAMUSCULAR; INTRAVENOUS at 16:49

## 2019-02-28 RX ADMIN — Medication 10 ML: at 20:43

## 2019-02-28 RX ADMIN — Medication 10 ML: at 10:27

## 2019-02-28 RX ADMIN — APIXABAN 2.5 MG: 2.5 TABLET, FILM COATED ORAL at 20:41

## 2019-02-28 RX ADMIN — ITRACONAZOLE 200 MG: 100 CAPSULE, COATED PELLETS ORAL at 14:20

## 2019-02-28 RX ADMIN — METOPROLOL SUCCINATE 25 MG: 25 TABLET, EXTENDED RELEASE ORAL at 10:24

## 2019-02-28 RX ADMIN — AMPICILLIN SODIUM AND SULBACTAM SODIUM 3 G: 2; 1 INJECTION, POWDER, FOR SOLUTION INTRAMUSCULAR; INTRAVENOUS at 10:39

## 2019-02-28 RX ADMIN — ITRACONAZOLE 200 MG: 100 CAPSULE, COATED PELLETS ORAL at 20:41

## 2019-02-28 ASSESSMENT — PAIN SCALES - GENERAL
PAINLEVEL_OUTOF10: 0
PAINLEVEL_OUTOF10: 6

## 2019-03-01 LAB
ANA PATTERN: ABNORMAL
ANA TITER: ABNORMAL
ANION GAP SERPL CALCULATED.3IONS-SCNC: 9 MMOL/L (ref 7–16)
ANTI-NUCLEAR ANTIBODY (ANA): POSITIVE
BUN BLDV-MCNC: 8 MG/DL (ref 8–23)
CALCIUM SERPL-MCNC: 7.8 MG/DL (ref 8.6–10.2)
CHLORIDE BLD-SCNC: 96 MMOL/L (ref 98–107)
CO2: 31 MMOL/L (ref 22–29)
CREAT SERPL-MCNC: 0.5 MG/DL (ref 0.5–1)
GFR AFRICAN AMERICAN: >60
GFR NON-AFRICAN AMERICAN: >60 ML/MIN/1.73
GLUCOSE BLD-MCNC: 110 MG/DL (ref 74–99)
HCT VFR BLD CALC: 29.7 % (ref 34–48)
HEMOGLOBIN: 9.5 G/DL (ref 11.5–15.5)
MCH RBC QN AUTO: 28.6 PG (ref 26–35)
MCHC RBC AUTO-ENTMCNC: 32 % (ref 32–34.5)
MCV RBC AUTO: 89.5 FL (ref 80–99.9)
MYELOPEROXIDASE AB: 0 AU/ML (ref 0–19)
PDW BLD-RTO: 13.9 FL (ref 11.5–15)
PLATELET # BLD: 387 E9/L (ref 130–450)
PMV BLD AUTO: 8.7 FL (ref 7–12)
POTASSIUM SERPL-SCNC: 3.5 MMOL/L (ref 3.5–5)
RBC # BLD: 3.32 E12/L (ref 3.5–5.5)
SERINE PROTEASE 3 AB: 389 AU/ML (ref 0–19)
SODIUM BLD-SCNC: 136 MMOL/L (ref 132–146)
WBC # BLD: 9.7 E9/L (ref 4.5–11.5)

## 2019-03-01 PROCEDURE — 87070 CULTURE OTHR SPECIMN AEROBIC: CPT

## 2019-03-01 PROCEDURE — 2580000003 HC RX 258: Performed by: SPECIALIST

## 2019-03-01 PROCEDURE — 6360000002 HC RX W HCPCS: Performed by: SPECIALIST

## 2019-03-01 PROCEDURE — 6360000002 HC RX W HCPCS: Performed by: INTERNAL MEDICINE

## 2019-03-01 PROCEDURE — 36415 COLL VENOUS BLD VENIPUNCTURE: CPT

## 2019-03-01 PROCEDURE — 97530 THERAPEUTIC ACTIVITIES: CPT

## 2019-03-01 PROCEDURE — 87206 SMEAR FLUORESCENT/ACID STAI: CPT

## 2019-03-01 PROCEDURE — 1200000000 HC SEMI PRIVATE

## 2019-03-01 PROCEDURE — 6370000000 HC RX 637 (ALT 250 FOR IP): Performed by: SPECIALIST

## 2019-03-01 PROCEDURE — 6370000000 HC RX 637 (ALT 250 FOR IP): Performed by: INTERNAL MEDICINE

## 2019-03-01 PROCEDURE — 85027 COMPLETE CBC AUTOMATED: CPT

## 2019-03-01 PROCEDURE — 2580000003 HC RX 258: Performed by: INTERNAL MEDICINE

## 2019-03-01 PROCEDURE — 97535 SELF CARE MNGMENT TRAINING: CPT

## 2019-03-01 PROCEDURE — 92526 ORAL FUNCTION THERAPY: CPT | Performed by: SPEECH-LANGUAGE PATHOLOGIST

## 2019-03-01 PROCEDURE — 80048 BASIC METABOLIC PNL TOTAL CA: CPT

## 2019-03-01 RX ORDER — METHYLPREDNISOLONE SODIUM SUCCINATE 40 MG/ML
40 INJECTION, POWDER, LYOPHILIZED, FOR SOLUTION INTRAMUSCULAR; INTRAVENOUS EVERY 12 HOURS
Status: DISCONTINUED | OUTPATIENT
Start: 2019-03-01 | End: 2019-03-03

## 2019-03-01 RX ADMIN — LEVOTHYROXINE SODIUM 50 MCG: 50 TABLET ORAL at 05:22

## 2019-03-01 RX ADMIN — AMPICILLIN SODIUM AND SULBACTAM SODIUM 3 G: 2; 1 INJECTION, POWDER, FOR SOLUTION INTRAMUSCULAR; INTRAVENOUS at 11:01

## 2019-03-01 RX ADMIN — ACETAMINOPHEN 500 MG: 500 TABLET ORAL at 09:26

## 2019-03-01 RX ADMIN — METHYLPREDNISOLONE SODIUM SUCCINATE 40 MG: 40 INJECTION, POWDER, FOR SOLUTION INTRAMUSCULAR; INTRAVENOUS at 17:13

## 2019-03-01 RX ADMIN — Medication 10 ML: at 09:26

## 2019-03-01 RX ADMIN — FLUTICASONE PROPIONATE 1 SPRAY: 50 SPRAY, METERED NASAL at 09:26

## 2019-03-01 RX ADMIN — ITRACONAZOLE 200 MG: 100 CAPSULE, COATED PELLETS ORAL at 21:48

## 2019-03-01 RX ADMIN — AMPICILLIN SODIUM AND SULBACTAM SODIUM 3 G: 2; 1 INJECTION, POWDER, FOR SOLUTION INTRAMUSCULAR; INTRAVENOUS at 05:22

## 2019-03-01 RX ADMIN — Medication 10 ML: at 21:48

## 2019-03-01 RX ADMIN — APIXABAN 2.5 MG: 2.5 TABLET, FILM COATED ORAL at 09:26

## 2019-03-01 RX ADMIN — ITRACONAZOLE 200 MG: 100 CAPSULE, COATED PELLETS ORAL at 09:26

## 2019-03-01 RX ADMIN — CHOLECALCIFEROL TAB 50 MCG (2000 UNIT) 2000 UNITS: 50 TAB at 09:26

## 2019-03-01 RX ADMIN — METOPROLOL SUCCINATE 25 MG: 25 TABLET, EXTENDED RELEASE ORAL at 09:26

## 2019-03-01 RX ADMIN — AMPICILLIN SODIUM AND SULBACTAM SODIUM 3 G: 2; 1 INJECTION, POWDER, FOR SOLUTION INTRAMUSCULAR; INTRAVENOUS at 17:13

## 2019-03-01 RX ADMIN — PREDNISONE 2.5 MG: 5 TABLET ORAL at 09:26

## 2019-03-01 RX ADMIN — AMPICILLIN SODIUM AND SULBACTAM SODIUM 3 G: 2; 1 INJECTION, POWDER, FOR SOLUTION INTRAMUSCULAR; INTRAVENOUS at 23:18

## 2019-03-01 ASSESSMENT — PAIN SCALES - GENERAL
PAINLEVEL_OUTOF10: 0
PAINLEVEL_OUTOF10: 0
PAINLEVEL_OUTOF10: 2
PAINLEVEL_OUTOF10: 0

## 2019-03-02 LAB
ANION GAP SERPL CALCULATED.3IONS-SCNC: 11 MMOL/L (ref 7–16)
BUN BLDV-MCNC: 15 MG/DL (ref 8–23)
CALCIUM SERPL-MCNC: 8.4 MG/DL (ref 8.6–10.2)
CHLORIDE BLD-SCNC: 95 MMOL/L (ref 98–107)
CO2: 32 MMOL/L (ref 22–29)
CREAT SERPL-MCNC: 0.5 MG/DL (ref 0.5–1)
GFR AFRICAN AMERICAN: >60
GFR NON-AFRICAN AMERICAN: >60 ML/MIN/1.73
GLUCOSE BLD-MCNC: 193 MG/DL (ref 74–99)
HCT VFR BLD CALC: 31.2 % (ref 34–48)
HEMOGLOBIN: 9.9 G/DL (ref 11.5–15.5)
MCH RBC QN AUTO: 28.4 PG (ref 26–35)
MCHC RBC AUTO-ENTMCNC: 31.7 % (ref 32–34.5)
MCV RBC AUTO: 89.7 FL (ref 80–99.9)
METER GLUCOSE: 174 MG/DL (ref 74–99)
METER GLUCOSE: 184 MG/DL (ref 74–99)
METER GLUCOSE: 251 MG/DL (ref 74–99)
PDW BLD-RTO: 14 FL (ref 11.5–15)
PLATELET # BLD: 432 E9/L (ref 130–450)
PMV BLD AUTO: 8.9 FL (ref 7–12)
POTASSIUM SERPL-SCNC: 4.4 MMOL/L (ref 3.5–5)
RBC # BLD: 3.48 E12/L (ref 3.5–5.5)
SODIUM BLD-SCNC: 138 MMOL/L (ref 132–146)
WBC # BLD: 7.3 E9/L (ref 4.5–11.5)

## 2019-03-02 PROCEDURE — 6370000000 HC RX 637 (ALT 250 FOR IP): Performed by: SPECIALIST

## 2019-03-02 PROCEDURE — 2580000003 HC RX 258: Performed by: INTERNAL MEDICINE

## 2019-03-02 PROCEDURE — 6370000000 HC RX 637 (ALT 250 FOR IP): Performed by: INTERNAL MEDICINE

## 2019-03-02 PROCEDURE — 85027 COMPLETE CBC AUTOMATED: CPT

## 2019-03-02 PROCEDURE — 80048 BASIC METABOLIC PNL TOTAL CA: CPT

## 2019-03-02 PROCEDURE — 36415 COLL VENOUS BLD VENIPUNCTURE: CPT

## 2019-03-02 PROCEDURE — 2580000003 HC RX 258: Performed by: SPECIALIST

## 2019-03-02 PROCEDURE — 6360000002 HC RX W HCPCS: Performed by: SPECIALIST

## 2019-03-02 PROCEDURE — 82962 GLUCOSE BLOOD TEST: CPT

## 2019-03-02 PROCEDURE — 84156 ASSAY OF PROTEIN URINE: CPT

## 2019-03-02 PROCEDURE — 1200000000 HC SEMI PRIVATE

## 2019-03-02 PROCEDURE — 82570 ASSAY OF URINE CREATININE: CPT

## 2019-03-02 PROCEDURE — 6360000002 HC RX W HCPCS: Performed by: INTERNAL MEDICINE

## 2019-03-02 RX ORDER — DEXTROSE MONOHYDRATE 50 MG/ML
100 INJECTION, SOLUTION INTRAVENOUS PRN
Status: DISCONTINUED | OUTPATIENT
Start: 2019-03-02 | End: 2019-03-08 | Stop reason: HOSPADM

## 2019-03-02 RX ORDER — DEXTROSE MONOHYDRATE 25 G/50ML
12.5 INJECTION, SOLUTION INTRAVENOUS PRN
Status: DISCONTINUED | OUTPATIENT
Start: 2019-03-02 | End: 2019-03-08 | Stop reason: HOSPADM

## 2019-03-02 RX ORDER — NICOTINE POLACRILEX 4 MG
15 LOZENGE BUCCAL PRN
Status: DISCONTINUED | OUTPATIENT
Start: 2019-03-02 | End: 2019-03-08 | Stop reason: HOSPADM

## 2019-03-02 RX ADMIN — INSULIN LISPRO 1 UNITS: 100 INJECTION, SOLUTION INTRAVENOUS; SUBCUTANEOUS at 17:05

## 2019-03-02 RX ADMIN — METHYLPREDNISOLONE SODIUM SUCCINATE 40 MG: 40 INJECTION, POWDER, FOR SOLUTION INTRAMUSCULAR; INTRAVENOUS at 04:25

## 2019-03-02 RX ADMIN — AMPICILLIN SODIUM AND SULBACTAM SODIUM 3 G: 2; 1 INJECTION, POWDER, FOR SOLUTION INTRAMUSCULAR; INTRAVENOUS at 04:55

## 2019-03-02 RX ADMIN — INSULIN LISPRO 3 UNITS: 100 INJECTION, SOLUTION INTRAVENOUS; SUBCUTANEOUS at 11:30

## 2019-03-02 RX ADMIN — ITRACONAZOLE 200 MG: 100 CAPSULE, COATED PELLETS ORAL at 20:45

## 2019-03-02 RX ADMIN — ITRACONAZOLE 200 MG: 100 CAPSULE, COATED PELLETS ORAL at 08:15

## 2019-03-02 RX ADMIN — METOPROLOL SUCCINATE 25 MG: 25 TABLET, EXTENDED RELEASE ORAL at 08:17

## 2019-03-02 RX ADMIN — Medication 10 ML: at 08:15

## 2019-03-02 RX ADMIN — AMPICILLIN SODIUM AND SULBACTAM SODIUM 3 G: 2; 1 INJECTION, POWDER, FOR SOLUTION INTRAMUSCULAR; INTRAVENOUS at 17:06

## 2019-03-02 RX ADMIN — INSULIN LISPRO 1 UNITS: 100 INJECTION, SOLUTION INTRAVENOUS; SUBCUTANEOUS at 20:46

## 2019-03-02 RX ADMIN — LEVOTHYROXINE SODIUM 50 MCG: 50 TABLET ORAL at 06:08

## 2019-03-02 RX ADMIN — AMPICILLIN SODIUM AND SULBACTAM SODIUM 3 G: 2; 1 INJECTION, POWDER, FOR SOLUTION INTRAMUSCULAR; INTRAVENOUS at 11:31

## 2019-03-02 RX ADMIN — CHOLECALCIFEROL TAB 50 MCG (2000 UNIT) 2000 UNITS: 50 TAB at 08:15

## 2019-03-02 RX ADMIN — METHYLPREDNISOLONE SODIUM SUCCINATE 40 MG: 40 INJECTION, POWDER, FOR SOLUTION INTRAMUSCULAR; INTRAVENOUS at 17:04

## 2019-03-02 RX ADMIN — FLUTICASONE PROPIONATE 1 SPRAY: 50 SPRAY, METERED NASAL at 08:21

## 2019-03-02 RX ADMIN — Medication 10 ML: at 20:45

## 2019-03-02 RX ADMIN — AMPICILLIN SODIUM AND SULBACTAM SODIUM 3 G: 2; 1 INJECTION, POWDER, FOR SOLUTION INTRAMUSCULAR; INTRAVENOUS at 22:33

## 2019-03-02 RX ADMIN — ACETAMINOPHEN 500 MG: 500 TABLET ORAL at 08:16

## 2019-03-02 ASSESSMENT — PAIN SCALES - GENERAL
PAINLEVEL_OUTOF10: 0
PAINLEVEL_OUTOF10: 0

## 2019-03-03 LAB
ANION GAP SERPL CALCULATED.3IONS-SCNC: 9 MMOL/L (ref 7–16)
APTT: 26.3 SEC (ref 24.5–35.1)
BUN BLDV-MCNC: 21 MG/DL (ref 8–23)
CALCIUM SERPL-MCNC: 8.4 MG/DL (ref 8.6–10.2)
CHLORIDE BLD-SCNC: 95 MMOL/L (ref 98–107)
CO2: 35 MMOL/L (ref 22–29)
CREAT SERPL-MCNC: 0.5 MG/DL (ref 0.5–1)
CREATININE URINE: 98 MG/DL (ref 29–226)
CULTURE, RESPIRATORY: NORMAL
GFR AFRICAN AMERICAN: >60
GFR NON-AFRICAN AMERICAN: >60 ML/MIN/1.73
GLUCOSE BLD-MCNC: 161 MG/DL (ref 74–99)
HCT VFR BLD CALC: 27.9 % (ref 34–48)
HEMOGLOBIN: 9.1 G/DL (ref 11.5–15.5)
INR BLD: 1.2
MCH RBC QN AUTO: 29.4 PG (ref 26–35)
MCHC RBC AUTO-ENTMCNC: 32.6 % (ref 32–34.5)
MCV RBC AUTO: 90 FL (ref 80–99.9)
METER GLUCOSE: 160 MG/DL (ref 74–99)
METER GLUCOSE: 192 MG/DL (ref 74–99)
METER GLUCOSE: 199 MG/DL (ref 74–99)
METER GLUCOSE: 265 MG/DL (ref 74–99)
PDW BLD-RTO: 14 FL (ref 11.5–15)
PLATELET # BLD: 445 E9/L (ref 130–450)
PMV BLD AUTO: 9.2 FL (ref 7–12)
POTASSIUM SERPL-SCNC: 3.8 MMOL/L (ref 3.5–5)
PROTEIN PROTEIN: 53 MG/DL (ref 0–12)
PROTEIN/CREAT RATIO: 0.5
PROTEIN/CREAT RATIO: 0.5 (ref 0–0.2)
PROTHROMBIN TIME: 14.2 SEC (ref 9.3–12.4)
RBC # BLD: 3.1 E12/L (ref 3.5–5.5)
SMEAR, RESPIRATORY: NORMAL
SODIUM BLD-SCNC: 139 MMOL/L (ref 132–146)
WBC # BLD: 9.9 E9/L (ref 4.5–11.5)

## 2019-03-03 PROCEDURE — 1200000000 HC SEMI PRIVATE

## 2019-03-03 PROCEDURE — 82962 GLUCOSE BLOOD TEST: CPT

## 2019-03-03 PROCEDURE — 85027 COMPLETE CBC AUTOMATED: CPT

## 2019-03-03 PROCEDURE — 6370000000 HC RX 637 (ALT 250 FOR IP): Performed by: INTERNAL MEDICINE

## 2019-03-03 PROCEDURE — 6370000000 HC RX 637 (ALT 250 FOR IP): Performed by: SPECIALIST

## 2019-03-03 PROCEDURE — 80048 BASIC METABOLIC PNL TOTAL CA: CPT

## 2019-03-03 PROCEDURE — 6360000002 HC RX W HCPCS: Performed by: SPECIALIST

## 2019-03-03 PROCEDURE — 36415 COLL VENOUS BLD VENIPUNCTURE: CPT

## 2019-03-03 PROCEDURE — 2580000003 HC RX 258: Performed by: INTERNAL MEDICINE

## 2019-03-03 PROCEDURE — 85610 PROTHROMBIN TIME: CPT

## 2019-03-03 PROCEDURE — 2580000003 HC RX 258: Performed by: SPECIALIST

## 2019-03-03 PROCEDURE — 85730 THROMBOPLASTIN TIME PARTIAL: CPT

## 2019-03-03 PROCEDURE — 6360000002 HC RX W HCPCS: Performed by: INTERNAL MEDICINE

## 2019-03-03 RX ORDER — PREDNISONE 20 MG/1
40 TABLET ORAL DAILY
Status: DISCONTINUED | OUTPATIENT
Start: 2019-03-04 | End: 2019-03-03 | Stop reason: CLARIF

## 2019-03-03 RX ORDER — PREDNISONE 20 MG/1
40 TABLET ORAL DAILY
Status: COMPLETED | OUTPATIENT
Start: 2019-03-04 | End: 2019-03-06

## 2019-03-03 RX ORDER — PREDNISONE 20 MG/1
20 TABLET ORAL DAILY
Status: DISCONTINUED | OUTPATIENT
Start: 2019-03-10 | End: 2019-03-08 | Stop reason: HOSPADM

## 2019-03-03 RX ORDER — PREDNISONE 1 MG/1
10 TABLET ORAL DAILY
Status: DISCONTINUED | OUTPATIENT
Start: 2019-03-13 | End: 2019-03-08 | Stop reason: HOSPADM

## 2019-03-03 RX ADMIN — AMPICILLIN SODIUM AND SULBACTAM SODIUM 3 G: 2; 1 INJECTION, POWDER, FOR SOLUTION INTRAMUSCULAR; INTRAVENOUS at 22:37

## 2019-03-03 RX ADMIN — INSULIN LISPRO 1 UNITS: 100 INJECTION, SOLUTION INTRAVENOUS; SUBCUTANEOUS at 20:46

## 2019-03-03 RX ADMIN — FLUTICASONE PROPIONATE 1 SPRAY: 50 SPRAY, METERED NASAL at 08:01

## 2019-03-03 RX ADMIN — AMPICILLIN SODIUM AND SULBACTAM SODIUM 3 G: 2; 1 INJECTION, POWDER, FOR SOLUTION INTRAMUSCULAR; INTRAVENOUS at 16:31

## 2019-03-03 RX ADMIN — AMPICILLIN SODIUM AND SULBACTAM SODIUM 3 G: 2; 1 INJECTION, POWDER, FOR SOLUTION INTRAMUSCULAR; INTRAVENOUS at 11:09

## 2019-03-03 RX ADMIN — Medication 10 ML: at 08:04

## 2019-03-03 RX ADMIN — AMPICILLIN SODIUM AND SULBACTAM SODIUM 3 G: 2; 1 INJECTION, POWDER, FOR SOLUTION INTRAMUSCULAR; INTRAVENOUS at 04:42

## 2019-03-03 RX ADMIN — METOPROLOL SUCCINATE 25 MG: 25 TABLET, EXTENDED RELEASE ORAL at 08:07

## 2019-03-03 RX ADMIN — ACETAMINOPHEN 500 MG: 500 TABLET ORAL at 08:04

## 2019-03-03 RX ADMIN — INSULIN LISPRO 1 UNITS: 100 INJECTION, SOLUTION INTRAVENOUS; SUBCUTANEOUS at 08:01

## 2019-03-03 RX ADMIN — INSULIN LISPRO 1 UNITS: 100 INJECTION, SOLUTION INTRAVENOUS; SUBCUTANEOUS at 16:33

## 2019-03-03 RX ADMIN — GUAIFENESIN AND CODEINE PHOSPHATE 10 ML: 10; 100 LIQUID ORAL at 02:34

## 2019-03-03 RX ADMIN — GUAIFENESIN AND CODEINE PHOSPHATE 10 ML: 10; 100 LIQUID ORAL at 20:50

## 2019-03-03 RX ADMIN — ITRACONAZOLE 200 MG: 100 CAPSULE, COATED PELLETS ORAL at 08:03

## 2019-03-03 RX ADMIN — METHYLPREDNISOLONE SODIUM SUCCINATE 40 MG: 40 INJECTION, POWDER, FOR SOLUTION INTRAMUSCULAR; INTRAVENOUS at 04:42

## 2019-03-03 RX ADMIN — INSULIN LISPRO 3 UNITS: 100 INJECTION, SOLUTION INTRAVENOUS; SUBCUTANEOUS at 11:10

## 2019-03-03 RX ADMIN — ITRACONAZOLE 200 MG: 100 CAPSULE, COATED PELLETS ORAL at 20:45

## 2019-03-03 RX ADMIN — CHOLECALCIFEROL TAB 50 MCG (2000 UNIT) 2000 UNITS: 50 TAB at 08:03

## 2019-03-03 RX ADMIN — Medication 10 ML: at 20:46

## 2019-03-03 RX ADMIN — LEVOTHYROXINE SODIUM 50 MCG: 50 TABLET ORAL at 06:18

## 2019-03-03 ASSESSMENT — PAIN SCALES - GENERAL
PAINLEVEL_OUTOF10: 0

## 2019-03-04 ENCOUNTER — APPOINTMENT (OUTPATIENT)
Dept: GENERAL RADIOLOGY | Age: 84
DRG: 178 | End: 2019-03-04
Payer: MEDICARE

## 2019-03-04 ENCOUNTER — APPOINTMENT (OUTPATIENT)
Dept: CT IMAGING | Age: 84
DRG: 178 | End: 2019-03-04
Payer: MEDICARE

## 2019-03-04 LAB
ANION GAP SERPL CALCULATED.3IONS-SCNC: 12 MMOL/L (ref 7–16)
BUN BLDV-MCNC: 27 MG/DL (ref 8–23)
CALCIUM SERPL-MCNC: 8.7 MG/DL (ref 8.6–10.2)
CHLORIDE BLD-SCNC: 92 MMOL/L (ref 98–107)
CO2: 33 MMOL/L (ref 22–29)
CREAT SERPL-MCNC: 0.6 MG/DL (ref 0.5–1)
GFR AFRICAN AMERICAN: >60
GFR NON-AFRICAN AMERICAN: >60 ML/MIN/1.73
GLUCOSE BLD-MCNC: 137 MG/DL (ref 74–99)
HCT VFR BLD CALC: 33.5 % (ref 34–48)
HEMOGLOBIN: 10.5 G/DL (ref 11.5–15.5)
MCH RBC QN AUTO: 28.4 PG (ref 26–35)
MCHC RBC AUTO-ENTMCNC: 31.3 % (ref 32–34.5)
MCV RBC AUTO: 90.5 FL (ref 80–99.9)
METER GLUCOSE: 116 MG/DL (ref 74–99)
METER GLUCOSE: 145 MG/DL (ref 74–99)
METER GLUCOSE: 169 MG/DL (ref 74–99)
METER GLUCOSE: 229 MG/DL (ref 74–99)
PDW BLD-RTO: 14.1 FL (ref 11.5–15)
PLATELET # BLD: 577 E9/L (ref 130–450)
PMV BLD AUTO: 9.2 FL (ref 7–12)
POTASSIUM SERPL-SCNC: 3.6 MMOL/L (ref 3.5–5)
RBC # BLD: 3.7 E12/L (ref 3.5–5.5)
SODIUM BLD-SCNC: 137 MMOL/L (ref 132–146)
WBC # BLD: 13.9 E9/L (ref 4.5–11.5)

## 2019-03-04 PROCEDURE — 88312 SPECIAL STAINS GROUP 1: CPT

## 2019-03-04 PROCEDURE — 32405 CT NEEDLE BIOPSY LUNG PERCUTANEOUS: CPT

## 2019-03-04 PROCEDURE — 2580000003 HC RX 258: Performed by: SPECIALIST

## 2019-03-04 PROCEDURE — 6370000000 HC RX 637 (ALT 250 FOR IP): Performed by: INTERNAL MEDICINE

## 2019-03-04 PROCEDURE — 6360000002 HC RX W HCPCS: Performed by: SPECIALIST

## 2019-03-04 PROCEDURE — 85027 COMPLETE CBC AUTOMATED: CPT

## 2019-03-04 PROCEDURE — 80048 BASIC METABOLIC PNL TOTAL CA: CPT

## 2019-03-04 PROCEDURE — 6360000002 HC RX W HCPCS: Performed by: RADIOLOGY

## 2019-03-04 PROCEDURE — 36415 COLL VENOUS BLD VENIPUNCTURE: CPT

## 2019-03-04 PROCEDURE — 1200000000 HC SEMI PRIVATE

## 2019-03-04 PROCEDURE — 71045 X-RAY EXAM CHEST 1 VIEW: CPT

## 2019-03-04 PROCEDURE — 97535 SELF CARE MNGMENT TRAINING: CPT

## 2019-03-04 PROCEDURE — 6370000000 HC RX 637 (ALT 250 FOR IP): Performed by: RADIOLOGY

## 2019-03-04 PROCEDURE — 2709999900 CT GUIDED NEEDLE PLACEMENT

## 2019-03-04 PROCEDURE — 6370000000 HC RX 637 (ALT 250 FOR IP): Performed by: SPECIALIST

## 2019-03-04 PROCEDURE — 82962 GLUCOSE BLOOD TEST: CPT

## 2019-03-04 PROCEDURE — 0BBJ3ZX EXCISION OF LEFT LOWER LUNG LOBE, PERCUTANEOUS APPROACH, DIAGNOSTIC: ICD-10-PCS | Performed by: RADIOLOGY

## 2019-03-04 PROCEDURE — 2580000003 HC RX 258: Performed by: INTERNAL MEDICINE

## 2019-03-04 PROCEDURE — 88305 TISSUE EXAM BY PATHOLOGIST: CPT

## 2019-03-04 PROCEDURE — 6370000000 HC RX 637 (ALT 250 FOR IP): Performed by: CLINICAL NURSE SPECIALIST

## 2019-03-04 PROCEDURE — 2500000003 HC RX 250 WO HCPCS: Performed by: RADIOLOGY

## 2019-03-04 RX ORDER — MIDAZOLAM HYDROCHLORIDE 1 MG/ML
1 INJECTION INTRAMUSCULAR; INTRAVENOUS ONCE
Status: COMPLETED | OUTPATIENT
Start: 2019-03-04 | End: 2019-03-04

## 2019-03-04 RX ORDER — SODIUM CHLORIDE 0.9 % (FLUSH) 0.9 %
10 SYRINGE (ML) INJECTION PRN
Status: DISCONTINUED | OUTPATIENT
Start: 2019-03-04 | End: 2019-03-08 | Stop reason: HOSPADM

## 2019-03-04 RX ORDER — FENTANYL CITRATE 50 UG/ML
50 INJECTION, SOLUTION INTRAMUSCULAR; INTRAVENOUS ONCE
Status: COMPLETED | OUTPATIENT
Start: 2019-03-04 | End: 2019-03-04

## 2019-03-04 RX ADMIN — CHOLECALCIFEROL TAB 50 MCG (2000 UNIT) 2000 UNITS: 50 TAB at 11:03

## 2019-03-04 RX ADMIN — Medication 10 ML: at 11:05

## 2019-03-04 RX ADMIN — MIDAZOLAM HYDROCHLORIDE 1 MG: 1 INJECTION, SOLUTION INTRAMUSCULAR; INTRAVENOUS at 09:54

## 2019-03-04 RX ADMIN — METOPROLOL SUCCINATE 25 MG: 25 TABLET, EXTENDED RELEASE ORAL at 11:04

## 2019-03-04 RX ADMIN — LIDOCAINE HYDROCHLORIDE 20 ML: 20 INJECTION, SOLUTION INFILTRATION; PERINEURAL at 10:00

## 2019-03-04 RX ADMIN — ACETAMINOPHEN 500 MG: 500 TABLET ORAL at 11:04

## 2019-03-04 RX ADMIN — INSULIN LISPRO 2 UNITS: 100 INJECTION, SOLUTION INTRAVENOUS; SUBCUTANEOUS at 16:42

## 2019-03-04 RX ADMIN — AMPICILLIN SODIUM AND SULBACTAM SODIUM 3 G: 2; 1 INJECTION, POWDER, FOR SOLUTION INTRAMUSCULAR; INTRAVENOUS at 04:40

## 2019-03-04 RX ADMIN — Medication 10 ML: at 16:47

## 2019-03-04 RX ADMIN — INSULIN LISPRO 1 UNITS: 100 INJECTION, SOLUTION INTRAVENOUS; SUBCUTANEOUS at 22:29

## 2019-03-04 RX ADMIN — AMPICILLIN SODIUM AND SULBACTAM SODIUM 3 G: 2; 1 INJECTION, POWDER, FOR SOLUTION INTRAMUSCULAR; INTRAVENOUS at 16:42

## 2019-03-04 RX ADMIN — Medication: at 10:00

## 2019-03-04 RX ADMIN — FENTANYL CITRATE 50 MCG: 50 INJECTION INTRAMUSCULAR; INTRAVENOUS at 09:54

## 2019-03-04 RX ADMIN — Medication 10 ML: at 22:26

## 2019-03-04 RX ADMIN — AMPICILLIN SODIUM AND SULBACTAM SODIUM 3 G: 2; 1 INJECTION, POWDER, FOR SOLUTION INTRAMUSCULAR; INTRAVENOUS at 11:02

## 2019-03-04 RX ADMIN — GUAIFENESIN AND CODEINE PHOSPHATE 10 ML: 10; 100 LIQUID ORAL at 22:48

## 2019-03-04 RX ADMIN — FLUTICASONE PROPIONATE 1 SPRAY: 50 SPRAY, METERED NASAL at 11:05

## 2019-03-04 RX ADMIN — ITRACONAZOLE 200 MG: 100 CAPSULE, COATED PELLETS ORAL at 11:04

## 2019-03-04 RX ADMIN — PREDNISONE 40 MG: 20 TABLET ORAL at 11:03

## 2019-03-04 RX ADMIN — AMPICILLIN SODIUM AND SULBACTAM SODIUM 3 G: 2; 1 INJECTION, POWDER, FOR SOLUTION INTRAMUSCULAR; INTRAVENOUS at 22:30

## 2019-03-04 RX ADMIN — ITRACONAZOLE 200 MG: 100 CAPSULE, COATED PELLETS ORAL at 22:26

## 2019-03-04 ASSESSMENT — PAIN SCALES - GENERAL
PAINLEVEL_OUTOF10: 0
PAINLEVEL_OUTOF10: 0
PAINLEVEL_OUTOF10: 5
PAINLEVEL_OUTOF10: 4

## 2019-03-05 LAB
ANCA IFA: ABNORMAL
ANION GAP SERPL CALCULATED.3IONS-SCNC: 7 MMOL/L (ref 7–16)
BUN BLDV-MCNC: 28 MG/DL (ref 8–23)
CALCIUM SERPL-MCNC: 8.1 MG/DL (ref 8.6–10.2)
CHLORIDE BLD-SCNC: 94 MMOL/L (ref 98–107)
CO2: 36 MMOL/L (ref 22–29)
CREAT SERPL-MCNC: 0.6 MG/DL (ref 0.5–1)
GFR AFRICAN AMERICAN: >60
GFR NON-AFRICAN AMERICAN: >60 ML/MIN/1.73
GLUCOSE BLD-MCNC: 127 MG/DL (ref 74–99)
HCT VFR BLD CALC: 28.7 % (ref 34–48)
HEMOGLOBIN: 9 G/DL (ref 11.5–15.5)
MCH RBC QN AUTO: 28.7 PG (ref 26–35)
MCHC RBC AUTO-ENTMCNC: 31.4 % (ref 32–34.5)
MCV RBC AUTO: 91.4 FL (ref 80–99.9)
METER GLUCOSE: 112 MG/DL (ref 74–99)
METER GLUCOSE: 122 MG/DL (ref 74–99)
METER GLUCOSE: 156 MG/DL (ref 74–99)
METER GLUCOSE: 205 MG/DL (ref 74–99)
PDW BLD-RTO: 14.3 FL (ref 11.5–15)
PLATELET # BLD: 442 E9/L (ref 130–450)
PMV BLD AUTO: 8.9 FL (ref 7–12)
POTASSIUM SERPL-SCNC: 3.6 MMOL/L (ref 3.5–5)
RBC # BLD: 3.14 E12/L (ref 3.5–5.5)
SODIUM BLD-SCNC: 137 MMOL/L (ref 132–146)
WBC # BLD: 12.4 E9/L (ref 4.5–11.5)

## 2019-03-05 PROCEDURE — 97530 THERAPEUTIC ACTIVITIES: CPT

## 2019-03-05 PROCEDURE — 80048 BASIC METABOLIC PNL TOTAL CA: CPT

## 2019-03-05 PROCEDURE — 36415 COLL VENOUS BLD VENIPUNCTURE: CPT

## 2019-03-05 PROCEDURE — 85027 COMPLETE CBC AUTOMATED: CPT

## 2019-03-05 PROCEDURE — 6370000000 HC RX 637 (ALT 250 FOR IP): Performed by: INTERNAL MEDICINE

## 2019-03-05 PROCEDURE — 82962 GLUCOSE BLOOD TEST: CPT

## 2019-03-05 PROCEDURE — 6360000002 HC RX W HCPCS: Performed by: SPECIALIST

## 2019-03-05 PROCEDURE — 6370000000 HC RX 637 (ALT 250 FOR IP): Performed by: SPECIALIST

## 2019-03-05 PROCEDURE — 6370000000 HC RX 637 (ALT 250 FOR IP): Performed by: CLINICAL NURSE SPECIALIST

## 2019-03-05 PROCEDURE — 2580000003 HC RX 258: Performed by: SPECIALIST

## 2019-03-05 PROCEDURE — 1200000000 HC SEMI PRIVATE

## 2019-03-05 PROCEDURE — 2580000003 HC RX 258: Performed by: INTERNAL MEDICINE

## 2019-03-05 RX ADMIN — AMPICILLIN SODIUM AND SULBACTAM SODIUM 3 G: 2; 1 INJECTION, POWDER, FOR SOLUTION INTRAMUSCULAR; INTRAVENOUS at 18:20

## 2019-03-05 RX ADMIN — ITRACONAZOLE 200 MG: 100 CAPSULE, COATED PELLETS ORAL at 21:19

## 2019-03-05 RX ADMIN — FLUTICASONE PROPIONATE 1 SPRAY: 50 SPRAY, METERED NASAL at 10:15

## 2019-03-05 RX ADMIN — ACETAMINOPHEN 500 MG: 500 TABLET ORAL at 10:16

## 2019-03-05 RX ADMIN — AMPICILLIN SODIUM AND SULBACTAM SODIUM 3 G: 2; 1 INJECTION, POWDER, FOR SOLUTION INTRAMUSCULAR; INTRAVENOUS at 05:22

## 2019-03-05 RX ADMIN — CHOLECALCIFEROL TAB 50 MCG (2000 UNIT) 2000 UNITS: 50 TAB at 10:15

## 2019-03-05 RX ADMIN — PREDNISONE 40 MG: 20 TABLET ORAL at 10:16

## 2019-03-05 RX ADMIN — ITRACONAZOLE 200 MG: 100 CAPSULE, COATED PELLETS ORAL at 10:15

## 2019-03-05 RX ADMIN — Medication 10 ML: at 10:16

## 2019-03-05 RX ADMIN — Medication 10 ML: at 21:20

## 2019-03-05 RX ADMIN — LEVOTHYROXINE SODIUM 50 MCG: 50 TABLET ORAL at 06:04

## 2019-03-05 RX ADMIN — INSULIN LISPRO 2 UNITS: 100 INJECTION, SOLUTION INTRAVENOUS; SUBCUTANEOUS at 17:12

## 2019-03-05 RX ADMIN — METOPROLOL SUCCINATE 25 MG: 25 TABLET, EXTENDED RELEASE ORAL at 10:15

## 2019-03-05 RX ADMIN — AMPICILLIN SODIUM AND SULBACTAM SODIUM 3 G: 2; 1 INJECTION, POWDER, FOR SOLUTION INTRAMUSCULAR; INTRAVENOUS at 22:44

## 2019-03-05 RX ADMIN — INSULIN LISPRO 1 UNITS: 100 INJECTION, SOLUTION INTRAVENOUS; SUBCUTANEOUS at 21:19

## 2019-03-05 RX ADMIN — AMPICILLIN SODIUM AND SULBACTAM SODIUM 3 G: 2; 1 INJECTION, POWDER, FOR SOLUTION INTRAMUSCULAR; INTRAVENOUS at 11:52

## 2019-03-05 RX ADMIN — Medication 10 ML: at 02:44

## 2019-03-05 ASSESSMENT — PAIN SCALES - GENERAL
PAINLEVEL_OUTOF10: 0

## 2019-03-06 LAB
ANION GAP SERPL CALCULATED.3IONS-SCNC: 8 MMOL/L (ref 7–16)
BUN BLDV-MCNC: 24 MG/DL (ref 8–23)
CALCIUM SERPL-MCNC: 8 MG/DL (ref 8.6–10.2)
CHLORIDE BLD-SCNC: 95 MMOL/L (ref 98–107)
CO2: 35 MMOL/L (ref 22–29)
CREAT SERPL-MCNC: 0.6 MG/DL (ref 0.5–1)
GFR AFRICAN AMERICAN: >60
GFR NON-AFRICAN AMERICAN: >60 ML/MIN/1.73
GLUCOSE BLD-MCNC: 126 MG/DL (ref 74–99)
HCT VFR BLD CALC: 29.6 % (ref 34–48)
HEMOGLOBIN: 9.1 G/DL (ref 11.5–15.5)
MCH RBC QN AUTO: 28.4 PG (ref 26–35)
MCHC RBC AUTO-ENTMCNC: 30.7 % (ref 32–34.5)
MCV RBC AUTO: 92.5 FL (ref 80–99.9)
METER GLUCOSE: 106 MG/DL (ref 74–99)
METER GLUCOSE: 112 MG/DL (ref 74–99)
METER GLUCOSE: 157 MG/DL (ref 74–99)
METER GLUCOSE: 191 MG/DL (ref 74–99)
MYELOPEROXIDASE AB: 0 AU/ML (ref 0–19)
PDW BLD-RTO: 14.7 FL (ref 11.5–15)
PLATELET # BLD: 433 E9/L (ref 130–450)
PMV BLD AUTO: 8.8 FL (ref 7–12)
POTASSIUM SERPL-SCNC: 3.4 MMOL/L (ref 3.5–5)
RBC # BLD: 3.2 E12/L (ref 3.5–5.5)
SERINE PROTEASE 3 AB: 385 AU/ML (ref 0–19)
SODIUM BLD-SCNC: 138 MMOL/L (ref 132–146)
WBC # BLD: 12.7 E9/L (ref 4.5–11.5)

## 2019-03-06 PROCEDURE — 2580000003 HC RX 258: Performed by: SPECIALIST

## 2019-03-06 PROCEDURE — 97530 THERAPEUTIC ACTIVITIES: CPT

## 2019-03-06 PROCEDURE — 6370000000 HC RX 637 (ALT 250 FOR IP): Performed by: SPECIALIST

## 2019-03-06 PROCEDURE — 85027 COMPLETE CBC AUTOMATED: CPT

## 2019-03-06 PROCEDURE — 36415 COLL VENOUS BLD VENIPUNCTURE: CPT

## 2019-03-06 PROCEDURE — 1200000000 HC SEMI PRIVATE

## 2019-03-06 PROCEDURE — 82962 GLUCOSE BLOOD TEST: CPT

## 2019-03-06 PROCEDURE — 2580000003 HC RX 258: Performed by: INTERNAL MEDICINE

## 2019-03-06 PROCEDURE — 80048 BASIC METABOLIC PNL TOTAL CA: CPT

## 2019-03-06 PROCEDURE — 6360000002 HC RX W HCPCS: Performed by: SPECIALIST

## 2019-03-06 PROCEDURE — 92526 ORAL FUNCTION THERAPY: CPT | Performed by: SPEECH-LANGUAGE PATHOLOGIST

## 2019-03-06 PROCEDURE — 6370000000 HC RX 637 (ALT 250 FOR IP): Performed by: INTERNAL MEDICINE

## 2019-03-06 PROCEDURE — 6370000000 HC RX 637 (ALT 250 FOR IP): Performed by: CLINICAL NURSE SPECIALIST

## 2019-03-06 RX ORDER — ASPIRIN 81 MG/1
81 TABLET ORAL DAILY
Status: DISCONTINUED | OUTPATIENT
Start: 2019-03-06 | End: 2019-03-08 | Stop reason: HOSPADM

## 2019-03-06 RX ORDER — POTASSIUM CHLORIDE 20 MEQ/1
20 TABLET, EXTENDED RELEASE ORAL ONCE
Status: COMPLETED | OUTPATIENT
Start: 2019-03-06 | End: 2019-03-06

## 2019-03-06 RX ADMIN — ASPIRIN 81 MG: 81 TABLET, COATED ORAL at 08:57

## 2019-03-06 RX ADMIN — ITRACONAZOLE 200 MG: 100 CAPSULE, COATED PELLETS ORAL at 21:29

## 2019-03-06 RX ADMIN — ITRACONAZOLE 200 MG: 100 CAPSULE, COATED PELLETS ORAL at 08:57

## 2019-03-06 RX ADMIN — ACETAMINOPHEN 500 MG: 500 TABLET ORAL at 08:57

## 2019-03-06 RX ADMIN — CHOLECALCIFEROL TAB 50 MCG (2000 UNIT) 2000 UNITS: 50 TAB at 08:57

## 2019-03-06 RX ADMIN — Medication 10 ML: at 11:30

## 2019-03-06 RX ADMIN — Medication 10 ML: at 08:56

## 2019-03-06 RX ADMIN — AMPICILLIN SODIUM AND SULBACTAM SODIUM 3 G: 2; 1 INJECTION, POWDER, FOR SOLUTION INTRAMUSCULAR; INTRAVENOUS at 23:30

## 2019-03-06 RX ADMIN — INSULIN LISPRO 1 UNITS: 100 INJECTION, SOLUTION INTRAVENOUS; SUBCUTANEOUS at 21:30

## 2019-03-06 RX ADMIN — Medication 10 ML: at 21:37

## 2019-03-06 RX ADMIN — FLUTICASONE PROPIONATE 1 SPRAY: 50 SPRAY, METERED NASAL at 08:56

## 2019-03-06 RX ADMIN — PREDNISONE 40 MG: 20 TABLET ORAL at 08:57

## 2019-03-06 RX ADMIN — LEVOTHYROXINE SODIUM 50 MCG: 50 TABLET ORAL at 06:05

## 2019-03-06 RX ADMIN — AMPICILLIN SODIUM AND SULBACTAM SODIUM 3 G: 2; 1 INJECTION, POWDER, FOR SOLUTION INTRAMUSCULAR; INTRAVENOUS at 16:32

## 2019-03-06 RX ADMIN — METOPROLOL SUCCINATE 25 MG: 25 TABLET, EXTENDED RELEASE ORAL at 08:57

## 2019-03-06 RX ADMIN — POTASSIUM CHLORIDE 20 MEQ: 20 TABLET, EXTENDED RELEASE ORAL at 08:56

## 2019-03-06 RX ADMIN — AMPICILLIN SODIUM AND SULBACTAM SODIUM 3 G: 2; 1 INJECTION, POWDER, FOR SOLUTION INTRAMUSCULAR; INTRAVENOUS at 11:30

## 2019-03-06 RX ADMIN — INSULIN LISPRO 1 UNITS: 100 INJECTION, SOLUTION INTRAVENOUS; SUBCUTANEOUS at 17:26

## 2019-03-06 RX ADMIN — AMPICILLIN SODIUM AND SULBACTAM SODIUM 3 G: 2; 1 INJECTION, POWDER, FOR SOLUTION INTRAMUSCULAR; INTRAVENOUS at 05:18

## 2019-03-06 ASSESSMENT — PAIN SCALES - GENERAL
PAINLEVEL_OUTOF10: 0

## 2019-03-07 LAB
ANION GAP SERPL CALCULATED.3IONS-SCNC: 10 MMOL/L (ref 7–16)
BUN BLDV-MCNC: 18 MG/DL (ref 8–23)
CALCIUM SERPL-MCNC: 8.4 MG/DL (ref 8.6–10.2)
CHLORIDE BLD-SCNC: 92 MMOL/L (ref 98–107)
CO2: 36 MMOL/L (ref 22–29)
CREAT SERPL-MCNC: 0.6 MG/DL (ref 0.5–1)
GFR AFRICAN AMERICAN: >60
GFR NON-AFRICAN AMERICAN: >60 ML/MIN/1.73
GLUCOSE BLD-MCNC: 107 MG/DL (ref 74–99)
HCT VFR BLD CALC: 33.8 % (ref 34–48)
HEMOGLOBIN: 10.4 G/DL (ref 11.5–15.5)
MCH RBC QN AUTO: 28.9 PG (ref 26–35)
MCHC RBC AUTO-ENTMCNC: 30.8 % (ref 32–34.5)
MCV RBC AUTO: 93.9 FL (ref 80–99.9)
METER GLUCOSE: 106 MG/DL (ref 74–99)
METER GLUCOSE: 133 MG/DL (ref 74–99)
METER GLUCOSE: 166 MG/DL (ref 74–99)
METER GLUCOSE: 170 MG/DL (ref 74–99)
PDW BLD-RTO: 15.9 FL (ref 11.5–15)
PLATELET # BLD: 489 E9/L (ref 130–450)
PMV BLD AUTO: 9.1 FL (ref 7–12)
POTASSIUM SERPL-SCNC: 3.5 MMOL/L (ref 3.5–5)
RBC # BLD: 3.6 E12/L (ref 3.5–5.5)
SODIUM BLD-SCNC: 138 MMOL/L (ref 132–146)
WBC # BLD: 13.5 E9/L (ref 4.5–11.5)

## 2019-03-07 PROCEDURE — 85027 COMPLETE CBC AUTOMATED: CPT

## 2019-03-07 PROCEDURE — 97530 THERAPEUTIC ACTIVITIES: CPT

## 2019-03-07 PROCEDURE — 6370000000 HC RX 637 (ALT 250 FOR IP): Performed by: INTERNAL MEDICINE

## 2019-03-07 PROCEDURE — 80048 BASIC METABOLIC PNL TOTAL CA: CPT

## 2019-03-07 PROCEDURE — 2580000003 HC RX 258: Performed by: INTERNAL MEDICINE

## 2019-03-07 PROCEDURE — 6370000000 HC RX 637 (ALT 250 FOR IP): Performed by: CLINICAL NURSE SPECIALIST

## 2019-03-07 PROCEDURE — 36415 COLL VENOUS BLD VENIPUNCTURE: CPT

## 2019-03-07 PROCEDURE — 92526 ORAL FUNCTION THERAPY: CPT | Performed by: SPEECH-LANGUAGE PATHOLOGIST

## 2019-03-07 PROCEDURE — 6360000002 HC RX W HCPCS: Performed by: SPECIALIST

## 2019-03-07 PROCEDURE — 6370000000 HC RX 637 (ALT 250 FOR IP): Performed by: SPECIALIST

## 2019-03-07 PROCEDURE — 2580000003 HC RX 258: Performed by: SPECIALIST

## 2019-03-07 PROCEDURE — 1200000000 HC SEMI PRIVATE

## 2019-03-07 PROCEDURE — 97535 SELF CARE MNGMENT TRAINING: CPT

## 2019-03-07 PROCEDURE — 82962 GLUCOSE BLOOD TEST: CPT

## 2019-03-07 RX ADMIN — FLUTICASONE PROPIONATE 1 SPRAY: 50 SPRAY, METERED NASAL at 08:58

## 2019-03-07 RX ADMIN — AMPICILLIN SODIUM AND SULBACTAM SODIUM 3 G: 2; 1 INJECTION, POWDER, FOR SOLUTION INTRAMUSCULAR; INTRAVENOUS at 12:32

## 2019-03-07 RX ADMIN — INSULIN LISPRO 1 UNITS: 100 INJECTION, SOLUTION INTRAVENOUS; SUBCUTANEOUS at 12:41

## 2019-03-07 RX ADMIN — ACETAMINOPHEN 500 MG: 500 TABLET ORAL at 08:58

## 2019-03-07 RX ADMIN — AMPICILLIN SODIUM AND SULBACTAM SODIUM 3 G: 2; 1 INJECTION, POWDER, FOR SOLUTION INTRAMUSCULAR; INTRAVENOUS at 05:29

## 2019-03-07 RX ADMIN — Medication 10 ML: at 20:30

## 2019-03-07 RX ADMIN — LEVOTHYROXINE SODIUM 50 MCG: 50 TABLET ORAL at 06:34

## 2019-03-07 RX ADMIN — INSULIN LISPRO 1 UNITS: 100 INJECTION, SOLUTION INTRAVENOUS; SUBCUTANEOUS at 20:30

## 2019-03-07 RX ADMIN — PREDNISONE 30 MG: 5 TABLET ORAL at 08:58

## 2019-03-07 RX ADMIN — METOPROLOL SUCCINATE 25 MG: 25 TABLET, EXTENDED RELEASE ORAL at 08:59

## 2019-03-07 RX ADMIN — Medication 10 ML: at 08:59

## 2019-03-07 RX ADMIN — ASPIRIN 81 MG: 81 TABLET, COATED ORAL at 08:59

## 2019-03-07 RX ADMIN — ITRACONAZOLE 200 MG: 100 CAPSULE, COATED PELLETS ORAL at 20:26

## 2019-03-07 RX ADMIN — AMPICILLIN SODIUM AND SULBACTAM SODIUM 3 G: 2; 1 INJECTION, POWDER, FOR SOLUTION INTRAMUSCULAR; INTRAVENOUS at 17:24

## 2019-03-07 RX ADMIN — CHOLECALCIFEROL TAB 50 MCG (2000 UNIT) 2000 UNITS: 50 TAB at 08:58

## 2019-03-07 RX ADMIN — ITRACONAZOLE 200 MG: 100 CAPSULE, COATED PELLETS ORAL at 08:59

## 2019-03-07 RX ADMIN — Medication 10 ML: at 12:32

## 2019-03-07 ASSESSMENT — PAIN SCALES - GENERAL
PAINLEVEL_OUTOF10: 0

## 2019-03-08 VITALS
HEART RATE: 95 BPM | BODY MASS INDEX: 25.27 KG/M2 | DIASTOLIC BLOOD PRESSURE: 68 MMHG | SYSTOLIC BLOOD PRESSURE: 148 MMHG | HEIGHT: 64 IN | TEMPERATURE: 97.5 F | RESPIRATION RATE: 18 BRPM | OXYGEN SATURATION: 96 % | WEIGHT: 148 LBS

## 2019-03-08 LAB
ANION GAP SERPL CALCULATED.3IONS-SCNC: 8 MMOL/L (ref 7–16)
BUN BLDV-MCNC: 18 MG/DL (ref 8–23)
CALCIUM SERPL-MCNC: 8 MG/DL (ref 8.6–10.2)
CHLORIDE BLD-SCNC: 97 MMOL/L (ref 98–107)
CO2: 34 MMOL/L (ref 22–29)
CREAT SERPL-MCNC: 0.5 MG/DL (ref 0.5–1)
GFR AFRICAN AMERICAN: >60
GFR NON-AFRICAN AMERICAN: >60 ML/MIN/1.73
GLUCOSE BLD-MCNC: 139 MG/DL (ref 74–99)
HCT VFR BLD CALC: 31.3 % (ref 34–48)
HEMOGLOBIN: 9.5 G/DL (ref 11.5–15.5)
MCH RBC QN AUTO: 28.4 PG (ref 26–35)
MCHC RBC AUTO-ENTMCNC: 30.4 % (ref 32–34.5)
MCV RBC AUTO: 93.7 FL (ref 80–99.9)
METER GLUCOSE: 100 MG/DL (ref 74–99)
METER GLUCOSE: 125 MG/DL (ref 74–99)
METER GLUCOSE: 185 MG/DL (ref 74–99)
PDW BLD-RTO: 15.9 FL (ref 11.5–15)
PLATELET # BLD: 482 E9/L (ref 130–450)
PMV BLD AUTO: 9.3 FL (ref 7–12)
POTASSIUM SERPL-SCNC: 3.6 MMOL/L (ref 3.5–5)
RBC # BLD: 3.34 E12/L (ref 3.5–5.5)
SODIUM BLD-SCNC: 139 MMOL/L (ref 132–146)
WBC # BLD: 10.5 E9/L (ref 4.5–11.5)

## 2019-03-08 PROCEDURE — 97530 THERAPEUTIC ACTIVITIES: CPT

## 2019-03-08 PROCEDURE — 6370000000 HC RX 637 (ALT 250 FOR IP): Performed by: SPECIALIST

## 2019-03-08 PROCEDURE — 6360000002 HC RX W HCPCS: Performed by: SPECIALIST

## 2019-03-08 PROCEDURE — 80048 BASIC METABOLIC PNL TOTAL CA: CPT

## 2019-03-08 PROCEDURE — 6370000000 HC RX 637 (ALT 250 FOR IP): Performed by: INTERNAL MEDICINE

## 2019-03-08 PROCEDURE — 85027 COMPLETE CBC AUTOMATED: CPT

## 2019-03-08 PROCEDURE — 92526 ORAL FUNCTION THERAPY: CPT | Performed by: SPEECH-LANGUAGE PATHOLOGIST

## 2019-03-08 PROCEDURE — 82962 GLUCOSE BLOOD TEST: CPT

## 2019-03-08 PROCEDURE — 6370000000 HC RX 637 (ALT 250 FOR IP): Performed by: CLINICAL NURSE SPECIALIST

## 2019-03-08 PROCEDURE — 36415 COLL VENOUS BLD VENIPUNCTURE: CPT

## 2019-03-08 PROCEDURE — 2580000003 HC RX 258: Performed by: SPECIALIST

## 2019-03-08 PROCEDURE — 2580000003 HC RX 258: Performed by: INTERNAL MEDICINE

## 2019-03-08 RX ORDER — ITRACONAZOLE 100 MG/1
200 CAPSULE ORAL 2 TIMES DAILY
Qty: 56 CAPSULE | Refills: 1 | Status: ON HOLD | DISCHARGE
Start: 2019-03-08 | End: 2019-04-09

## 2019-03-08 RX ORDER — LEVOTHYROXINE SODIUM 0.05 MG/1
50 TABLET ORAL DAILY
Qty: 30 TABLET | Refills: 3 | DISCHARGE
Start: 2019-03-09

## 2019-03-08 RX ORDER — PREDNISONE 20 MG/1
20 TABLET ORAL DAILY
Qty: 10 TABLET | Refills: 0 | DISCHARGE
Start: 2019-03-10 | End: 2019-03-20

## 2019-03-08 RX ORDER — CHOLECALCIFEROL (VITAMIN D3) 50 MCG
2000 TABLET ORAL DAILY
Qty: 30 TABLET | DISCHARGE
Start: 2019-03-08

## 2019-03-08 RX ORDER — ACETAMINOPHEN 500 MG
500 TABLET ORAL DAILY
Qty: 120 TABLET | Refills: 3 | DISCHARGE
Start: 2019-03-08

## 2019-03-08 RX ORDER — PREDNISONE 10 MG/1
30 TABLET ORAL DAILY
Qty: 30 TABLET | Refills: 0 | DISCHARGE
Start: 2019-03-08 | End: 2019-03-18

## 2019-03-08 RX ORDER — AMOXICILLIN AND CLAVULANATE POTASSIUM 875; 125 MG/1; MG/1
1 TABLET, FILM COATED ORAL EVERY 12 HOURS SCHEDULED
Qty: 20 TABLET | Refills: 0 | DISCHARGE
Start: 2019-03-08 | End: 2019-03-29

## 2019-03-08 RX ORDER — AMOXICILLIN AND CLAVULANATE POTASSIUM 875; 125 MG/1; MG/1
1 TABLET, FILM COATED ORAL EVERY 12 HOURS SCHEDULED
Status: DISCONTINUED | OUTPATIENT
Start: 2019-03-08 | End: 2019-03-08 | Stop reason: HOSPADM

## 2019-03-08 RX ORDER — FLUTICASONE PROPIONATE 50 MCG
1 SPRAY, SUSPENSION (ML) NASAL DAILY
Qty: 1 BOTTLE | Refills: 3 | DISCHARGE
Start: 2019-03-08

## 2019-03-08 RX ORDER — PREDNISONE 10 MG/1
10 TABLET ORAL DAILY
Qty: 10 TABLET | Refills: 0 | DISCHARGE
Start: 2019-03-13 | End: 2019-03-23

## 2019-03-08 RX ORDER — METOPROLOL SUCCINATE 25 MG/1
25 TABLET, EXTENDED RELEASE ORAL DAILY
Qty: 30 TABLET | Refills: 3 | DISCHARGE
Start: 2019-03-08

## 2019-03-08 RX ORDER — ASPIRIN 81 MG/1
81 TABLET ORAL DAILY
Qty: 30 TABLET | Refills: 3 | Status: ON HOLD | DISCHARGE
Start: 2019-03-08 | End: 2019-04-09 | Stop reason: HOSPADM

## 2019-03-08 RX ADMIN — AMPICILLIN SODIUM AND SULBACTAM SODIUM 3 G: 2; 1 INJECTION, POWDER, FOR SOLUTION INTRAMUSCULAR; INTRAVENOUS at 11:06

## 2019-03-08 RX ADMIN — Medication 10 ML: at 02:56

## 2019-03-08 RX ADMIN — AMPICILLIN SODIUM AND SULBACTAM SODIUM 3 G: 2; 1 INJECTION, POWDER, FOR SOLUTION INTRAMUSCULAR; INTRAVENOUS at 05:30

## 2019-03-08 RX ADMIN — ASPIRIN 81 MG: 81 TABLET, COATED ORAL at 08:42

## 2019-03-08 RX ADMIN — FLUTICASONE PROPIONATE 1 SPRAY: 50 SPRAY, METERED NASAL at 08:44

## 2019-03-08 RX ADMIN — CHOLECALCIFEROL TAB 50 MCG (2000 UNIT) 2000 UNITS: 50 TAB at 08:42

## 2019-03-08 RX ADMIN — PREDNISONE 30 MG: 5 TABLET ORAL at 08:42

## 2019-03-08 RX ADMIN — LEVOTHYROXINE SODIUM 50 MCG: 50 TABLET ORAL at 06:04

## 2019-03-08 RX ADMIN — INSULIN LISPRO 1 UNITS: 100 INJECTION, SOLUTION INTRAVENOUS; SUBCUTANEOUS at 16:09

## 2019-03-08 RX ADMIN — METOPROLOL SUCCINATE 25 MG: 25 TABLET, EXTENDED RELEASE ORAL at 08:41

## 2019-03-08 RX ADMIN — ACETAMINOPHEN 500 MG: 500 TABLET ORAL at 08:42

## 2019-03-08 RX ADMIN — Medication 10 ML: at 08:44

## 2019-03-08 RX ADMIN — ITRACONAZOLE 200 MG: 100 CAPSULE, COATED PELLETS ORAL at 08:42

## 2019-03-08 ASSESSMENT — PAIN SCALES - GENERAL
PAINLEVEL_OUTOF10: 1
PAINLEVEL_OUTOF10: 0

## 2019-03-08 ASSESSMENT — PAIN DESCRIPTION - FREQUENCY: FREQUENCY: INTERMITTENT

## 2019-03-08 ASSESSMENT — PAIN DESCRIPTION - LOCATION: LOCATION: GENERALIZED

## 2019-03-08 ASSESSMENT — PAIN DESCRIPTION - ONSET: ONSET: GRADUAL

## 2019-03-08 ASSESSMENT — PAIN DESCRIPTION - PAIN TYPE: TYPE: ACUTE PAIN

## 2019-03-08 ASSESSMENT — PAIN - FUNCTIONAL ASSESSMENT: PAIN_FUNCTIONAL_ASSESSMENT: ACTIVITIES ARE NOT PREVENTED

## 2019-04-04 ENCOUNTER — APPOINTMENT (OUTPATIENT)
Dept: GENERAL RADIOLOGY | Age: 84
DRG: 377 | End: 2019-04-04
Payer: MEDICARE

## 2019-04-04 ENCOUNTER — TELEPHONE (OUTPATIENT)
Dept: OTHER | Facility: CLINIC | Age: 84
End: 2019-04-04

## 2019-04-04 ENCOUNTER — HOSPITAL ENCOUNTER (INPATIENT)
Age: 84
LOS: 8 days | Discharge: NURSING, REHAB OR LONG-TERM CARE | DRG: 377 | End: 2019-04-12
Attending: EMERGENCY MEDICINE | Admitting: INTERNAL MEDICINE
Payer: MEDICARE

## 2019-04-04 DIAGNOSIS — K92.2 GASTROINTESTINAL HEMORRHAGE, UNSPECIFIED GASTROINTESTINAL HEMORRHAGE TYPE: Primary | ICD-10-CM

## 2019-04-04 LAB
ABO/RH: NORMAL
ALBUMIN SERPL-MCNC: 2.5 G/DL (ref 3.5–5.2)
ALP BLD-CCNC: 78 U/L (ref 35–104)
ALT SERPL-CCNC: 9 U/L (ref 0–32)
ANION GAP SERPL CALCULATED.3IONS-SCNC: 9 MMOL/L (ref 7–16)
ANTIBODY SCREEN: NORMAL
APTT: 27 SEC (ref 24.5–35.1)
AST SERPL-CCNC: 10 U/L (ref 0–31)
BASOPHILS ABSOLUTE: 0.02 E9/L (ref 0–0.2)
BASOPHILS RELATIVE PERCENT: 0.2 % (ref 0–2)
BILIRUB SERPL-MCNC: 0.5 MG/DL (ref 0–1.2)
BUN BLDV-MCNC: 17 MG/DL (ref 8–23)
CALCIUM SERPL-MCNC: 8.5 MG/DL (ref 8.6–10.2)
CHLORIDE BLD-SCNC: 97 MMOL/L (ref 98–107)
CO2: 34 MMOL/L (ref 22–29)
CREAT SERPL-MCNC: 0.6 MG/DL (ref 0.5–1)
EOSINOPHILS ABSOLUTE: 0.04 E9/L (ref 0.05–0.5)
EOSINOPHILS RELATIVE PERCENT: 0.3 % (ref 0–6)
GFR AFRICAN AMERICAN: >60
GFR NON-AFRICAN AMERICAN: >60 ML/MIN/1.73
GLUCOSE BLD-MCNC: 113 MG/DL (ref 74–99)
HCT VFR BLD CALC: 29.3 % (ref 34–48)
HCT VFR BLD CALC: 33.2 % (ref 34–48)
HEMOGLOBIN: 10 G/DL (ref 11.5–15.5)
HEMOGLOBIN: 8.9 G/DL (ref 11.5–15.5)
IMMATURE GRANULOCYTES #: 0.12 E9/L
IMMATURE GRANULOCYTES %: 1 % (ref 0–5)
INR BLD: 1.6
LACTIC ACID: 2.9 MMOL/L (ref 0.5–2.2)
LYMPHOCYTES ABSOLUTE: 1.38 E9/L (ref 1.5–4)
LYMPHOCYTES RELATIVE PERCENT: 11.2 % (ref 20–42)
MCH RBC QN AUTO: 28.7 PG (ref 26–35)
MCHC RBC AUTO-ENTMCNC: 30.1 % (ref 32–34.5)
MCV RBC AUTO: 95.1 FL (ref 80–99.9)
MONOCYTES ABSOLUTE: 0.57 E9/L (ref 0.1–0.95)
MONOCYTES RELATIVE PERCENT: 4.6 % (ref 2–12)
NEUTROPHILS ABSOLUTE: 10.16 E9/L (ref 1.8–7.3)
NEUTROPHILS RELATIVE PERCENT: 82.7 % (ref 43–80)
PDW BLD-RTO: 17.9 FL (ref 11.5–15)
PLATELET # BLD: 344 E9/L (ref 130–450)
PMV BLD AUTO: 9 FL (ref 7–12)
POTASSIUM SERPL-SCNC: 4.4 MMOL/L (ref 3.5–5)
PROTHROMBIN TIME: 18.2 SEC (ref 9.3–12.4)
RBC # BLD: 3.49 E12/L (ref 3.5–5.5)
SODIUM BLD-SCNC: 140 MMOL/L (ref 132–146)
TOTAL PROTEIN: 5.9 G/DL (ref 6.4–8.3)
TROPONIN: <0.01 NG/ML (ref 0–0.03)
WBC # BLD: 12.3 E9/L (ref 4.5–11.5)

## 2019-04-04 PROCEDURE — 86900 BLOOD TYPING SEROLOGIC ABO: CPT

## 2019-04-04 PROCEDURE — 86901 BLOOD TYPING SEROLOGIC RH(D): CPT

## 2019-04-04 PROCEDURE — 85610 PROTHROMBIN TIME: CPT

## 2019-04-04 PROCEDURE — 85025 COMPLETE CBC W/AUTO DIFF WBC: CPT

## 2019-04-04 PROCEDURE — 84484 ASSAY OF TROPONIN QUANT: CPT

## 2019-04-04 PROCEDURE — 94760 N-INVAS EAR/PLS OXIMETRY 1: CPT

## 2019-04-04 PROCEDURE — 85018 HEMOGLOBIN: CPT

## 2019-04-04 PROCEDURE — 86850 RBC ANTIBODY SCREEN: CPT

## 2019-04-04 PROCEDURE — 2060000000 HC ICU INTERMEDIATE R&B

## 2019-04-04 PROCEDURE — 6370000000 HC RX 637 (ALT 250 FOR IP): Performed by: INTERNAL MEDICINE

## 2019-04-04 PROCEDURE — 85014 HEMATOCRIT: CPT

## 2019-04-04 PROCEDURE — 2580000003 HC RX 258: Performed by: EMERGENCY MEDICINE

## 2019-04-04 PROCEDURE — 71045 X-RAY EXAM CHEST 1 VIEW: CPT

## 2019-04-04 PROCEDURE — 93005 ELECTROCARDIOGRAM TRACING: CPT | Performed by: EMERGENCY MEDICINE

## 2019-04-04 PROCEDURE — 83605 ASSAY OF LACTIC ACID: CPT

## 2019-04-04 PROCEDURE — 80053 COMPREHEN METABOLIC PANEL: CPT

## 2019-04-04 PROCEDURE — 99285 EMERGENCY DEPT VISIT HI MDM: CPT

## 2019-04-04 PROCEDURE — 85730 THROMBOPLASTIN TIME PARTIAL: CPT

## 2019-04-04 PROCEDURE — 36415 COLL VENOUS BLD VENIPUNCTURE: CPT

## 2019-04-04 RX ORDER — PREDNISONE 10 MG/1
10 TABLET ORAL EVERY MORNING
COMMUNITY
Start: 2019-03-31 | End: 2019-04-09

## 2019-04-04 RX ORDER — GUAIFENESIN 100 MG/5ML
200 SYRUP ORAL EVERY 6 HOURS PRN
COMMUNITY

## 2019-04-04 RX ORDER — BISACODYL 10 MG
10 SUPPOSITORY, RECTAL RECTAL DAILY PRN
COMMUNITY

## 2019-04-04 RX ORDER — ALBUTEROL SULFATE 2.5 MG/3ML
2.5 SOLUTION RESPIRATORY (INHALATION) EVERY 6 HOURS PRN
Status: DISCONTINUED | OUTPATIENT
Start: 2019-04-04 | End: 2019-04-12 | Stop reason: HOSPADM

## 2019-04-04 RX ORDER — ACETAMINOPHEN 325 MG/1
650 TABLET ORAL EVERY 4 HOURS PRN
Status: ON HOLD | COMMUNITY
End: 2019-04-09 | Stop reason: HOSPADM

## 2019-04-04 RX ORDER — FLUTICASONE PROPIONATE 50 MCG
1 SPRAY, SUSPENSION (ML) NASAL DAILY
Status: DISCONTINUED | OUTPATIENT
Start: 2019-04-04 | End: 2019-04-12 | Stop reason: HOSPADM

## 2019-04-04 RX ORDER — ITRACONAZOLE 100 MG/1
200 CAPSULE ORAL 2 TIMES DAILY
Status: DISCONTINUED | OUTPATIENT
Start: 2019-04-04 | End: 2019-04-12 | Stop reason: HOSPADM

## 2019-04-04 RX ORDER — PREDNISONE 1 MG/1
10 TABLET ORAL EVERY MORNING
Status: DISCONTINUED | OUTPATIENT
Start: 2019-04-05 | End: 2019-04-12 | Stop reason: HOSPADM

## 2019-04-04 RX ORDER — 0.9 % SODIUM CHLORIDE 0.9 %
1000 INTRAVENOUS SOLUTION INTRAVENOUS ONCE
Status: COMPLETED | OUTPATIENT
Start: 2019-04-04 | End: 2019-04-04

## 2019-04-04 RX ORDER — CHOLECALCIFEROL (VITAMIN D3) 50 MCG
2000 TABLET ORAL DAILY
Status: DISCONTINUED | OUTPATIENT
Start: 2019-04-04 | End: 2019-04-12 | Stop reason: HOSPADM

## 2019-04-04 RX ORDER — MIRTAZAPINE 15 MG/1
15 TABLET, FILM COATED ORAL NIGHTLY
Status: DISCONTINUED | OUTPATIENT
Start: 2019-04-04 | End: 2019-04-12 | Stop reason: HOSPADM

## 2019-04-04 RX ORDER — LEVOTHYROXINE SODIUM 0.05 MG/1
50 TABLET ORAL DAILY
Status: DISCONTINUED | OUTPATIENT
Start: 2019-04-04 | End: 2019-04-12 | Stop reason: HOSPADM

## 2019-04-04 RX ORDER — METOPROLOL SUCCINATE 25 MG/1
25 TABLET, EXTENDED RELEASE ORAL DAILY
Status: DISCONTINUED | OUTPATIENT
Start: 2019-04-04 | End: 2019-04-12 | Stop reason: HOSPADM

## 2019-04-04 RX ORDER — DOXYCYCLINE HYCLATE 100 MG
100 TABLET ORAL 2 TIMES DAILY
Status: ON HOLD | COMMUNITY
Start: 2019-04-03 | End: 2019-04-09 | Stop reason: HOSPADM

## 2019-04-04 RX ORDER — GUAIFENESIN AND CODEINE PHOSPHATE 100; 10 MG/5ML; MG/5ML
10 SOLUTION ORAL EVERY 6 HOURS PRN
Status: ON HOLD | COMMUNITY
End: 2019-04-09 | Stop reason: HOSPADM

## 2019-04-04 RX ORDER — SODIUM PHOSPHATE, DIBASIC AND SODIUM PHOSPHATE, MONOBASIC 7; 19 G/133ML; G/133ML
1 ENEMA RECTAL
COMMUNITY

## 2019-04-04 RX ORDER — ALBUTEROL SULFATE 2.5 MG/3ML
2.5 SOLUTION RESPIRATORY (INHALATION) EVERY 6 HOURS PRN
COMMUNITY

## 2019-04-04 RX ORDER — MIRTAZAPINE 15 MG/1
15 TABLET, FILM COATED ORAL NIGHTLY
COMMUNITY

## 2019-04-04 RX ADMIN — SODIUM CHLORIDE 1000 ML: 9 INJECTION, SOLUTION INTRAVENOUS at 08:40

## 2019-04-04 RX ADMIN — MIRTAZAPINE 15 MG: 15 TABLET, FILM COATED ORAL at 20:12

## 2019-04-04 RX ADMIN — ITRACONAZOLE 200 MG: 100 CAPSULE ORAL at 20:11

## 2019-04-04 ASSESSMENT — PAIN SCALES - GENERAL
PAINLEVEL_OUTOF10: 2
PAINLEVEL_OUTOF10: 0
PAINLEVEL_OUTOF10: 0

## 2019-04-04 ASSESSMENT — PAIN - FUNCTIONAL ASSESSMENT: PAIN_FUNCTIONAL_ASSESSMENT: ACTIVITIES ARE NOT PREVENTED

## 2019-04-04 ASSESSMENT — PAIN DESCRIPTION - LOCATION: LOCATION: RIB CAGE

## 2019-04-04 ASSESSMENT — PAIN DESCRIPTION - PAIN TYPE: TYPE: ACUTE PAIN

## 2019-04-04 ASSESSMENT — PAIN DESCRIPTION - ORIENTATION: ORIENTATION: LEFT

## 2019-04-04 NOTE — ED NOTES
Per registration, POA would like called by a nurse or doctor to be updated.      Braulio Gutiérrez, RN  04/04/19 [id number]

## 2019-04-04 NOTE — CONSULTS
GENERAL SURGERY  CONSULT NOTE  4/4/2019    Physician Consulted: Dr. Jace Ramirez  Reason for Consult: GI bleed  Referring Physician: Dr. Ryan Saravia    JOSE ANTONIO Cali is a 80 y.o. female who presents for evaluation of hematochezia with clots from the nursing home. She is very hard of hearing and a poor historian. History mainly comes from chart review. 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 She was recently admitted last month with weakness and aspiration pneumonia. She was discharged on abx and aspirin. She has surgical hx of esophageal dilatation, cholecystectomy, appendectomy, hysterectomy. Date of last scope is uncertain. Hgb was 10 on admission. She was hemodynamically stable. White count and lactic are elevated. INR is 1.6. She received 1L bolus of NS in the ED. 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 Maria Elena Kenny    RT HIP        Medications Prior to Admission:    Prior to Admission medications    Medication Sig Start Date End Date Taking?  Authorizing Provider   apixaban (ELIQUIS) 2.5 MG TABS tablet Take 2.5 mg by mouth 2 times daily    Yes Historical Provider, MD   mirtazapine (REMERON) 15 MG tablet Take 15 mg by mouth nightly   Yes Historical Provider, MD   predniSONE (DELTASONE) 10 MG tablet Take 10 mg by mouth every morning  3/31/19 4/9/19 Yes Historical Provider, MD   doxycycline hyclate (VIBRA-TABS) 100 MG tablet Take 100 mg by mouth 2 times daily 4/3/19 4/13/19 Yes Historical Provider, MD   albuterol (PROVENTIL) (2.5 MG/3ML) 0.083% nebulizer solution Take 2.5 mg by nebulization every 6 hours as needed for Wheezing or Shortness of Breath   Yes Historical Provider, MD   guaiFENesin (ROBITUSSIN) 100 MG/5ML syrup Take 200 mg by mouth every 6 hours as needed for Cough   Yes Historical Provider, MD   bisacodyl (DULCOLAX) 10 MG suppository Place 10 mg rectally daily as needed for Constipation   Yes Historical Provider, MD   Sodium Phosphates (FLEET) 7-19 GM/118ML Place 1 enema rectally once as needed   Yes Historical Provider, MD   guaiFENesin-codeine (TUSSI-ORGANIDIN NR) 100-10 MG/5ML syrup Take 10 mLs by mouth every 6 hours as needed for Cough.    Yes Historical Provider, MD   magnesium hydroxide (MILK OF MAGNESIA) 400 MG/5ML suspension Take 30 mLs by mouth daily as needed for Constipation   Yes Historical Provider, MD   acetaminophen (TYLENOL) 325 MG tablet Take 650 mg by mouth every 4 hours as needed for Pain or Fever   Yes Historical Provider, MD   acetaminophen (TYLENOL) 500 MG tablet Take 1 tablet by mouth daily  Patient taking differently: Take 500 mg by mouth every morning  3/8/19  Yes Georgia Carlisle MD   aspirin 81 MG EC tablet Take 1 tablet by mouth daily  Patient taking differently: Take 81 mg by mouth every morning  3/8/19  Yes Georgia Carlisle MD   metoprolol succinate (TOPROL XL) 25 MG extended release tablet Take 1 tablet by mouth daily  Patient taking differently: Take 25 mg by mouth every morning  3/8/19  Yes Georgia Carlisle MD   fluticasone (FLONASE) 50 MCG/ACT nasal spray 1 spray by Each Nare route daily  Patient taking differently: 1 spray by Each Nare route every morning  3/8/19  Yes Georgia Carlisle MD   levothyroxine (SYNTHROID) 50 MCG tablet Take 1 tablet by mouth daily  Patient taking differently: Take 50 mcg by mouth every morning (before breakfast)  3/9/19  Yes Jeffry Eller MD   Cholecalciferol (VITAMIN D) 2000 units TABS tablet Take 1 tablet by mouth daily  Patient taking differently: Take 2,000 Units by mouth every morning  3/8/19  Yes Jeffry Eller MD   itraconazole (SPORANOX) 100 MG capsule Take 2 capsules by mouth 2 times daily  Patient taking differently: Take 100 mg by mouth 2 times daily  3/8/19 4/7/19 Yes Donna Estrada MD   guaiFENesin-codeine (GUAIFENESIN AC) 100-10 MG/5ML liquid Take 10 mLs by mouth every 6 hours as needed for Cough 17  Yes Jeffry Eller MD   vitamin B-12 (CYANOCOBALAMIN) 1000 MCG tablet Take 6,000 mcg by mouth every morning    Yes Historical Provider, MD       No Known Allergies    Family History   Problem Relation Age of Onset    High Blood Pressure Mother     Arthritis Mother     Stroke Mother     Cancer Sister         breast    Stroke Maternal Aunt     High Blood Pressure Brother     Stroke Maternal Grandmother     Cancer Maternal Cousin        Social History     Tobacco Use    Smoking status: Former Smoker     Packs/day: 0.25     Last attempt to quit: 1971     Years since quittin.2    Smokeless tobacco: Never Used   Substance Use Topics    Alcohol use: Yes     Comment: rare    Drug use: No         Review of Systems - patient is poor historian  General ROS: positive for  - malaise  negative for - chills or fever  Hematological and Lymphatic ROS: positive for - bleeding problems and bruising  Respiratory ROS: negative for - cough or hemoptysis  Cardiovascular ROS: no chest pain or dyspnea on exertion  Gastrointestinal ROS: no abdominal pain, noted hematochezia today. does not admit to history of this.          PHYSICAL EXAM:    Vitals:    19 1224   BP: 132/61   Pulse: 75   Resp: 16   Temp: 99.6 °F (37.6 °C)   SpO2: 97%       General Appearance:  in no acute distress, frail appearing. Cooperative but very hard of hearing  Skin:  Positives: no jaundice or rashes. Areas of ecchymosis scattered  Head/face:  atraumatic  Eyes:  EOMI  Lungs:  Breathing Pattern: regular, no distress  Heart:   RRR, +murmur  Abdomen:  Supra pubic tenderness, soft, non distended  Extremities: pale      LABS:  CBC  Recent Labs     19  0845   WBC 12.3*   HGB 10.0*   HCT 33.2*        BMP  Recent Labs     19  0845      K 4.4   CL 97*   CO2 34*   BUN 17   CREATININE 0.6   CALCIUM 8.5*     Liver Function  Recent Labs     19  0845   BILITOT 0.5   AST 10   ALT 9   ALKPHOS 78   PROT 5.9*   LABALBU 2.5*     No results for input(s): LACTATE in the last 72 hours. Recent Labs     19  0845   INR 1.6       RADIOLOGY    Xr Chest Portable    Result Date: 2019  Patient MRN: [id number] : 1928 Age:  80 years Gender: Female Order Date: 2019 8:30 AM Exam: XR CHEST PORTABLE Number of Images: 1 view Indication:   cough cough Comparison: Prior chest radiograph from 2019 Findings: The study demonstrate cardiomegaly with left ventricular contour there is moderate left-sided pleural effusion which was seen before and appears to be unchanged. The previously seen interstitial pulmonary edema has resolved in the present study. There is uncoiling atherosclerotic change of thoracic aorta.  The bony thorax demonstrate osteopenia     Cardiomegaly Moderate left-sided pleural effusion unchanged from prior study No evidence of any airspace consolidation or pulmonary venous congestion         ASSESSMENT:  80 y.o. female with hematochezia and mild anemia    PLAN:    Consider cscope if patient agreeable  IVF  NPO  Monitor H and H, transfuse prn by medical staff      Electronically signed by Natacha Covarrubias DO on 19 at 2:08 PM

## 2019-04-04 NOTE — ED NOTES
Report faxed, confirmation printed, floor called and notified     Constantine Pearson RN  04/04/19 [id number]

## 2019-04-04 NOTE — TELEPHONE ENCOUNTER
Writer contacted Dr. Stella Hampton, ED provider to inform of 30 day readmission risk. ED provider informed writer of readmission.

## 2019-04-04 NOTE — PROGRESS NOTES
Patient had 3 occurrences of  bright red rectal bleeding with clots and abdominal distension. Surgery updated.

## 2019-04-04 NOTE — ED PROVIDER NOTES
Department of Emergency Medicine   ED  Provider Note  Admit Date/RoomTime: 4/4/2019  8:15 AM  ED Room: 0516/0516-A          History of Present Illness:  4/4/19, Time: 8:25 AM         Elaine Orona is a 80 y.o. female presenting to the ED for GI bleeding, beginning earlier today. The complaint has been constant, moderate in severity, and worsened by nothing. Pt comes from a nursing home by EMS. Staff reported that pt had hematochezia with clots. Pt states that she has not had GI bleeding in the past. Denies needing an transfusion in the past. Reports that she would not like to have surgery if needed. Pt has had some shortness of breath recently. Has hx of hypertension, CAD, and PVD. Denies chest pain, abdominal pain, nausea, emesis, and further complaints at this time. Review of Systems:   Pertinent positives and negatives are stated within HPI, all other systems reviewed and are negative.      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 Past Surgical History:  has a past surgical history that includes Dilatation, esophagus (N/A, 1998); Hysterectomy (1995); Colonoscopy; Appendectomy (1950); joint replacement (Right, 1999); and embolectomy (Right, 3-4-15). Social History:  reports that she quit smoking about 48 years ago. She smoked 0.25 packs per day. She has never used smokeless tobacco. She reports that she drinks alcohol. She reports that she does not use drugs.     Family History: family history includes Arthritis in her mother; Cancer in her maternal cousin and sister; High performed on unspun fluid  Polymorphonuclear leukocytes not seen  Epithelial cells not seen  No organisms seen     Acid Fast Culture With Smear   Result Value Ref Range    AFB Culture (Mycobacteria) No growth after 1 week/s of incubation. AFB Smear No AFB observed by Fluorescent stain    Fungus culture   Result Value Ref Range    Fungus (Mycology) Culture No growth after 1 week/s of incubation.      Fungus Stain No Fungal elements seen    Troponin   Result Value Ref Range    Troponin <0.01 0.00 - 0.03 ng/mL   CBC Auto Differential   Result Value Ref Range    WBC 12.3 (H) 4.5 - 11.5 E9/L    RBC 3.49 (L) 3.50 - 5.50 E12/L    Hemoglobin 10.0 (L) 11.5 - 15.5 g/dL    Hematocrit 33.2 (L) 34.0 - 48.0 %    MCV 95.1 80.0 - 99.9 fL    MCH 28.7 26.0 - 35.0 pg    MCHC 30.1 (L) 32.0 - 34.5 %    RDW 17.9 (H) 11.5 - 15.0 fL    Platelets 932 664 - 651 E9/L    MPV 9.0 7.0 - 12.0 fL    Neutrophils % 82.7 (H) 43.0 - 80.0 %    Immature Granulocytes % 1.0 0.0 - 5.0 %    Lymphocytes % 11.2 (L) 20.0 - 42.0 %    Monocytes % 4.6 2.0 - 12.0 %    Eosinophils % 0.3 0.0 - 6.0 %    Basophils % 0.2 0.0 - 2.0 %    Neutrophils # 10.16 (H) 1.80 - 7.30 E9/L    Immature Granulocytes # 0.12 E9/L    Lymphocytes # 1.38 (L) 1.50 - 4.00 E9/L    Monocytes # 0.57 0.10 - 0.95 E9/L    Eosinophils # 0.04 (L) 0.05 - 0.50 E9/L    Basophils # 0.02 0.00 - 0.20 E9/L   Comprehensive Metabolic Panel   Result Value Ref Range    Sodium 140 132 - 146 mmol/L    Potassium 4.4 3.5 - 5.0 mmol/L    Chloride 97 (L) 98 - 107 mmol/L    CO2 34 (H) 22 - 29 mmol/L    Anion Gap 9 7 - 16 mmol/L    Glucose 113 (H) 74 - 99 mg/dL    BUN 17 8 - 23 mg/dL    CREATININE 0.6 0.5 - 1.0 mg/dL    GFR Non-African American >60 >=60 mL/min/1.73    GFR African American >60     Calcium 8.5 (L) 8.6 - 10.2 mg/dL    Total Protein 5.9 (L) 6.4 - 8.3 g/dL    Alb 2.5 (L) 3.5 - 5.2 g/dL    Total Bilirubin 0.5 0.0 - 1.2 mg/dL    Alkaline Phosphatase 78 35 - 104 U/L    ALT 9 0 - 32 U/L    AST 10 0 - 31 U/L Protime-INR   Result Value Ref Range    Protime 18.2 (H) 9.3 - 12.4 sec    INR 1.6    APTT   Result Value Ref Range    aPTT 27.0 24.5 - 35.1 sec   Lactic Acid, Plasma   Result Value Ref Range    Lactic Acid 2.9 (H) 0.5 - 2.2 mmol/L   Hemoglobin and hematocrit, blood   Result Value Ref Range    Hemoglobin 8.9 (L) 11.5 - 15.5 g/dL    Hematocrit 29.3 (L) 34.0 - 48.0 %   Comprehensive metabolic panel   Result Value Ref Range    Sodium 139 132 - 146 mmol/L    Potassium 3.1 (L) 3.5 - 5.0 mmol/L    Chloride 100 98 - 107 mmol/L    CO2 31 (H) 22 - 29 mmol/L    Anion Gap 8 7 - 16 mmol/L    Glucose 107 (H) 74 - 99 mg/dL    BUN 15 8 - 23 mg/dL    CREATININE 0.5 0.5 - 1.0 mg/dL    GFR Non-African American >60 >=60 mL/min/1.73    GFR African American >60     Calcium 7.7 (L) 8.6 - 10.2 mg/dL    Total Protein 5.3 (L) 6.4 - 8.3 g/dL    Alb 2.1 (L) 3.5 - 5.2 g/dL    Total Bilirubin 0.6 0.0 - 1.2 mg/dL    Alkaline Phosphatase 69 35 - 104 U/L    ALT 6 0 - 32 U/L    AST 8 0 - 31 U/L   CBC   Result Value Ref Range    WBC 11.7 (H) 4.5 - 11.5 E9/L    RBC 2.92 (L) 3.50 - 5.50 E12/L    Hemoglobin 8.3 (L) 11.5 - 15.5 g/dL    Hematocrit 27.9 (L) 34.0 - 48.0 %    MCV 95.5 80.0 - 99.9 fL    MCH 28.4 26.0 - 35.0 pg    MCHC 29.7 (L) 32.0 - 34.5 %    RDW 18.1 (H) 11.5 - 15.0 fL    Platelets 282 387 - 127 E9/L    MPV 9.1 7.0 - 12.0 fL   Comprehensive metabolic panel   Result Value Ref Range    Sodium 143 132 - 146 mmol/L    Potassium 3.4 (L) 3.5 - 5.0 mmol/L    Chloride 103 98 - 107 mmol/L    CO2 29 22 - 29 mmol/L    Anion Gap 11 7 - 16 mmol/L    Glucose 111 (H) 74 - 99 mg/dL    BUN 17 8 - 23 mg/dL    CREATININE 0.4 (L) 0.5 - 1.0 mg/dL    GFR Non-African American >60 >=60 mL/min/1.73    GFR African American >60     Calcium 8.0 (L) 8.6 - 10.2 mg/dL    Total Protein 5.3 (L) 6.4 - 8.3 g/dL    Alb 2.1 (L) 3.5 - 5.2 g/dL    Total Bilirubin 0.5 0.0 - 1.2 mg/dL    Alkaline Phosphatase 66 35 - 104 U/L    ALT 6 0 - 32 U/L    AST 8 0 - 31 U/L   CBC   Result Value Ref Range    WBC 12.5 (H) 4.5 - 11.5 E9/L    RBC 2.83 (L) 3.50 - 5.50 E12/L    Hemoglobin 8.1 (L) 11.5 - 15.5 g/dL    Hematocrit 26.8 (L) 34.0 - 48.0 %    MCV 94.7 80.0 - 99.9 fL    MCH 28.6 26.0 - 35.0 pg    MCHC 30.2 (L) 32.0 - 34.5 %    RDW 17.7 (H) 11.5 - 15.0 fL    Platelets 384 372 - 279 E9/L    MPV 9.4 7.0 - 12.0 fL   Comprehensive metabolic panel   Result Value Ref Range    Sodium 141 132 - 146 mmol/L    Potassium 3.3 (L) 3.5 - 5.0 mmol/L    Chloride 99 98 - 107 mmol/L    CO2 33 (H) 22 - 29 mmol/L    Anion Gap 9 7 - 16 mmol/L    Glucose 115 (H) 74 - 99 mg/dL    BUN 18 8 - 23 mg/dL    CREATININE 0.5 0.5 - 1.0 mg/dL    GFR Non-African American >60 >=60 mL/min/1.73    GFR African American >60     Calcium 8.2 (L) 8.6 - 10.2 mg/dL    Total Protein 5.6 (L) 6.4 - 8.3 g/dL    Alb 2.4 (L) 3.5 - 5.2 g/dL    Total Bilirubin 0.4 0.0 - 1.2 mg/dL    Alkaline Phosphatase 69 35 - 104 U/L    ALT 7 0 - 32 U/L    AST 7 0 - 31 U/L   CBC   Result Value Ref Range    WBC 12.7 (H) 4.5 - 11.5 E9/L    RBC 2.69 (L) 3.50 - 5.50 E12/L    Hemoglobin 7.7 (L) 11.5 - 15.5 g/dL    Hematocrit 25.7 (L) 34.0 - 48.0 %    MCV 95.5 80.0 - 99.9 fL    MCH 28.6 26.0 - 35.0 pg    MCHC 30.0 (L) 32.0 - 34.5 %    RDW 17.6 (H) 11.5 - 15.0 fL    Platelets 073 295 - 146 E9/L    MPV 9.4 7.0 - 12.0 fL   Comprehensive metabolic panel   Result Value Ref Range    Sodium 144 132 - 146 mmol/L    Potassium 4.0 3.5 - 5.0 mmol/L    Chloride 101 98 - 107 mmol/L    CO2 35 (H) 22 - 29 mmol/L    Anion Gap 8 7 - 16 mmol/L    Glucose 100 (H) 74 - 99 mg/dL    BUN 16 8 - 23 mg/dL    CREATININE 0.5 0.5 - 1.0 mg/dL    GFR Non-African American >60 >=60 mL/min/1.73    GFR African American >60     Calcium 8.3 (L) 8.6 - 10.2 mg/dL    Total Protein 5.7 (L) 6.4 - 8.3 g/dL    Alb 2.3 (L) 3.5 - 5.2 g/dL    Total Bilirubin 0.4 0.0 - 1.2 mg/dL    Alkaline Phosphatase 71 35 - 104 U/L    ALT 10 0 - 32 U/L    AST 15 0 - 31 U/L   CBC   Result Value Ref Range    WBC 13.3 (H) 4.5 - 11.5 E9/L    RBC 2.87 (L) 3.50 - 5.50 E12/L    Hemoglobin 8.0 (L) 11.5 - 15.5 g/dL    Hematocrit 27.1 (L) 34.0 - 48.0 %    MCV 94.4 80.0 - 99.9 fL    MCH 27.9 26.0 - 35.0 pg    MCHC 29.5 (L) 32.0 - 34.5 %    RDW 17.7 (H) 11.5 - 15.0 fL    Platelets 528 168 - 549 E9/L    MPV 9.3 7.0 - 12.0 fL   CBC   Result Value Ref Range    WBC 14.9 (H) 4.5 - 11.5 E9/L    RBC 2.93 (L) 3.50 - 5.50 E12/L    Hemoglobin 8.2 (L) 11.5 - 15.5 g/dL    Hematocrit 28.3 (L) 34.0 - 48.0 %    MCV 96.6 80.0 - 99.9 fL    MCH 28.0 26.0 - 35.0 pg    MCHC 29.0 (L) 32.0 - 34.5 %    RDW 18.1 (H) 11.5 - 15.0 fL    Platelets 473 583 - 695 E9/L    MPV 10.0 7.0 - 12.0 fL   C-Reactive Protein   Result Value Ref Range    CRP 10.0 (H) 0.0 - 0.4 mg/dL   Sedimentation Rate   Result Value Ref Range    Sed Rate 115 (H) 0 - 20 mm/Hr   Comprehensive metabolic panel   Result Value Ref Range    Sodium 143 132 - 146 mmol/L    Potassium 3.4 (L) 3.5 - 5.0 mmol/L    Chloride 102 98 - 107 mmol/L    CO2 31 (H) 22 - 29 mmol/L    Anion Gap 10 7 - 16 mmol/L    Glucose 115 (H) 74 - 99 mg/dL    BUN 20 8 - 23 mg/dL    CREATININE 0.5 0.5 - 1.0 mg/dL    GFR Non-African American >60 >=60 mL/min/1.73    GFR African American >60     Calcium 8.1 (L) 8.6 - 10.2 mg/dL    Total Protein 5.5 (L) 6.4 - 8.3 g/dL    Alb 2.2 (L) 3.5 - 5.2 g/dL    Total Bilirubin 0.4 0.0 - 1.2 mg/dL    Alkaline Phosphatase 78 35 - 104 U/L    ALT 12 0 - 32 U/L    AST 13 0 - 31 U/L   CBC   Result Value Ref Range    WBC 15.1 (H) 4.5 - 11.5 E9/L    RBC 2.69 (L) 3.50 - 5.50 E12/L    Hemoglobin 7.8 (L) 11.5 - 15.5 g/dL    Hematocrit 25.5 (L) 34.0 - 48.0 %    MCV 94.8 80.0 - 99.9 fL    MCH 29.0 26.0 - 35.0 pg    MCHC 30.6 (L) 32.0 - 34.5 %    RDW 18.9 (H) 11.5 - 15.0 fL    Platelets 438 034 - 766 E9/L    MPV 9.5 7.0 - 12.0 fL   Blood Gas, Arterial   Result Value Ref Range    Date Analyzed [id number]     Time Analyzed 1625     Source: Blood Arterial     pH, Blood Gas 7.509 (H) 7.350 - 7.450    PCO2 41.6 35.0 - 45.0 mmHg PO2 105.2 (H) 60.0 - 100.0 mmHg    HCO3 32.4 (H) 22.0 - 26.0 mmol/L    B.E. 8.6 (H) -3.0 - 3.0 mmol/L    O2 Sat 98.1 92.0 - 98.5 %    O2Hb 96.7 94.0 - 97.0 %    COHb 1.2 0.0 - 1.5 %    MetHb 0.2 0.0 - 1.5 %    O2 Content 11.6 mL/dL    HHb 1.9 0.0 - 5.0 %    tHb (est) 8.4 (L) 11.5 - 16.5 g/dL    Mode NC-  2L     Date Of Collection      Time Collected      Pt Temp 37.0 C     ID [id number]     Lab [id number]     Critical(s) Notified .  No Critical Values    BODY FLUID CELL COUNT WITH DIFFERENTIAL   Result Value Ref Range    Cell Count Fluid Type Pleural     Color, Fluid Red     Appearance, Fluid Bloody     Nucl Cell, Fluid 396 /uL    RBC, Fluid 76,000 /uL    Neutrophil Count, Fluid 21 %    Monocyte Count, Fluid 79 %   Protein, body fluid   Result Value Ref Range    Protein, Fluid 1.5 Not Established g/dL    Fluid Type Pleural    Lactate dehydrogenase, body fluid   Result Value Ref Range    LD, Fluid 63 Not Established U/L   Glucose, body fluid   Result Value Ref Range    Glucose, Fluid 201 Not Established mg/dL   Comprehensive metabolic panel   Result Value Ref Range    Sodium 139 132 - 146 mmol/L    Potassium 4.4 3.5 - 5.0 mmol/L    Chloride 98 98 - 107 mmol/L    CO2 33 (H) 22 - 29 mmol/L    Anion Gap 8 7 - 16 mmol/L    Glucose 87 74 - 99 mg/dL    BUN 19 8 - 23 mg/dL    CREATININE 0.5 0.5 - 1.0 mg/dL    GFR Non-African American >60 >=60 mL/min/1.73    GFR African American >60     Calcium 8.4 (L) 8.6 - 10.2 mg/dL    Total Protein 5.7 (L) 6.4 - 8.3 g/dL    Alb 2.3 (L) 3.5 - 5.2 g/dL    Total Bilirubin 0.3 0.0 - 1.2 mg/dL    Alkaline Phosphatase 75 35 - 104 U/L    ALT 13 0 - 32 U/L    AST 18 0 - 31 U/L   CBC   Result Value Ref Range    WBC 13.5 (H) 4.5 - 11.5 E9/L    RBC 2.75 (L) 3.50 - 5.50 E12/L    Hemoglobin 8.0 (L) 11.5 - 15.5 g/dL    Hematocrit 26.9 (L) 34.0 - 48.0 %    MCV 97.8 80.0 - 99.9 fL    MCH 29.1 26.0 - 35.0 pg    MCHC 29.7 (L) 32.0 - 34.5 %    RDW 19.3 (H) 11.5 - 15.0 fL    Platelets 612 506 - 078 E9/L    MPV 9.9 7.0 - 12.0 fL   Comprehensive metabolic panel   Result Value Ref Range    Sodium 143 132 - 146 mmol/L    Potassium 4.4 3.5 - 5.0 mmol/L    Chloride 101 98 - 107 mmol/L    CO2 34 (H) 22 - 29 mmol/L    Anion Gap 8 7 - 16 mmol/L    Glucose 109 (H) 74 - 99 mg/dL    BUN 19 8 - 23 mg/dL    CREATININE 0.5 0.5 - 1.0 mg/dL    GFR Non-African American >60 >=60 mL/min/1.73    GFR African American >60     Calcium 8.4 (L) 8.6 - 10.2 mg/dL    Total Protein 5.8 (L) 6.4 - 8.3 g/dL    Alb 2.4 (L) 3.5 - 5.2 g/dL    Total Bilirubin 0.4 0.0 - 1.2 mg/dL    Alkaline Phosphatase 76 35 - 104 U/L    ALT 15 0 - 32 U/L    AST 14 0 - 31 U/L   CBC   Result Value Ref Range    WBC 13.0 (H) 4.5 - 11.5 E9/L    RBC 2.83 (L) 3.50 - 5.50 E12/L    Hemoglobin 8.1 (L) 11.5 - 15.5 g/dL    Hematocrit 27.8 (L) 34.0 - 48.0 %    MCV 98.2 80.0 - 99.9 fL    MCH 28.6 26.0 - 35.0 pg    MCHC 29.1 (L) 32.0 - 34.5 %    RDW 19.0 (H) 11.5 - 15.0 fL    Platelets 115 942 - 330 E9/L    MPV 9.4 7.0 - 12.0 fL   EKG 12 Lead   Result Value Ref Range    Ventricular Rate 86 BPM    Atrial Rate 86 BPM    P-R Interval 142 ms    QRS Duration 68 ms    Q-T Interval 366 ms    QTc Calculation (Bazett) 437 ms    P Axis 57 degrees    R Axis 21 degrees    T Axis 25 degrees   TYPE AND SCREEN   Result Value Ref Range    ABO/Rh O POS     Antibody Screen NEG        RADIOLOGY:  Interpreted by Radiologist.  XR CHEST PORTABLE   Final Result   1. After recent left side thoracentesis. 2. No left-sided pneumothorax. US THORACENTESIS   Final Result   As above. CT Chest WO Contrast   Final Result   Development large left pleural effusion and moderate size right   pleural effusion since prior study. New area of significant   atelectasis in the lingula. There are significant areas of bibasilar   atelectasis/infiltrate. Focal masses as was suggested on the prior CT   are not delineated on the current study.       Follow-up evaluation in 6 weeks is recommended         XR CHEST PORTABLE   Final Result      Cardiomegaly      Moderate left-sided pleural effusion unchanged from prior study      No evidence of any airspace consolidation or pulmonary venous   congestion               EKG:  This EKG is signed and interpreted by the EP. Time: 08:36  Rate: 86  Rhythm: normal sinus   Interpretation: premature supraventricular complexes/occasional PVC, low voltage QRS, septal infarct-age undetermined   Comparison: no STEMI    ------------------------- NURSING NOTES AND VITALS REVIEWED ---------------------------   The nursing notes within the ED encounter and vital signs as below have been reviewed by myself. /71   Pulse 94   Temp 98.1 °F (36.7 °C) (Oral)   Resp 18   Ht 5' 4\" (1.626 m)   Wt 134 lb 2 oz (60.8 kg)   SpO2 96%   BMI 23.02 kg/m²   Oxygen Saturation Interpretation: Normal    The patients available past medical records and past encounters were reviewed. ------------------------------ ED COURSE/MEDICAL DECISION MAKING----------------------  Medications   potassium bicarb-citric acid (EFFER-K) effervescent tablet 20 mEq (20 mEq Oral Not Given 4/7/19 0759)   0.9 % sodium chloride bolus (0 mLs Intravenous Stopped 4/4/19 1238)   ferric gluconate (FERRLECIT) 25 mg in sodium chloride 0.9 % 50 mL (test dose) IVPB (0 mg Intravenous Stopped 4/7/19 1041)     Followed by   ferric gluconate (FERRLECIT) 100 mg in sodium chloride 0.9 % 100 mL IVPB (0 mg Intravenous Stopped 4/7/19 1404)     Followed by   ferric gluconate (FERRLECIT) 125 mg in sodium chloride 0.9 % 100 mL IVPB (0 mg Intravenous Stopped 4/8/19 0824)   potassium bicarb-citric acid (EFFER-K) effervescent tablet 20 mEq (20 mEq Oral Given 4/7/19 2134)   potassium chloride (KLOR-CON M) extended release tablet 40 mEq (40 mEq Oral Given 4/10/19 1419)       Medical Decision Making:    Patient comes to the ED with GI bleeding from nursing home.  Pt denies having a hx of GI bleed in the past. Had IV fluid, lab work, chest XR, EKG, and monitoring ordered in the ED. This patient's ED course included: a personal history and physicial examination, re-evaluation prior to disposition, IV medications, cardiac monitoring and continuous pulse oximetry    This patient has been closely monitored during their ED course. Re-Evaluations:           Discussed results. Upon re-examination, patients symptoms stable. The emergency provider has shared the specific reasons for admission, and has shared results of today's workup. The patient is agreeable for admission. Consultations:             Time: 10:29 AM.   Spoke with Dr. Cindy Artis MD (Medicine). Discussed case. They will admit this patient and will provide consultation. Spoke with Dr. Fawn Zelaya (Surgery). Discussed case. They will provide consultation. Counseling: The emergency provider has spoken with the patient and discussed todays results, in addition to providing specific details for the plan of care and counseling regarding the diagnosis and prognosis. Questions are answered at this time and they are agreeable with the plan.       --------------------------------- IMPRESSION AND DISPOSITION ---------------------------------    IMPRESSION  1. Gastrointestinal hemorrhage, unspecified gastrointestinal hemorrhage type        DISPOSITION  Disposition: Admit to telemetry  Patient condition is serious    SCRIBE ATTESTATION  4/4/19, 8:25 AM.    This note is prepared by Samm Joaquin acting as Scribe for Ynes Bernal MD.    Ynes Bernal MD:  The scribe's documentation has been prepared under my direction and personally reviewed by me in its entirety. I confirm that the note above accurately reflects all work, treatment, procedures, and medical decision making performed by me.              Ynes Bernal MD  04/27/19 2016

## 2019-04-05 LAB
ALBUMIN SERPL-MCNC: 2.1 G/DL (ref 3.5–5.2)
ALP BLD-CCNC: 69 U/L (ref 35–104)
ALT SERPL-CCNC: 6 U/L (ref 0–32)
ANION GAP SERPL CALCULATED.3IONS-SCNC: 8 MMOL/L (ref 7–16)
AST SERPL-CCNC: 8 U/L (ref 0–31)
BILIRUB SERPL-MCNC: 0.6 MG/DL (ref 0–1.2)
BUN BLDV-MCNC: 15 MG/DL (ref 8–23)
CALCIUM SERPL-MCNC: 7.7 MG/DL (ref 8.6–10.2)
CHLORIDE BLD-SCNC: 100 MMOL/L (ref 98–107)
CO2: 31 MMOL/L (ref 22–29)
CREAT SERPL-MCNC: 0.5 MG/DL (ref 0.5–1)
EKG ATRIAL RATE: 86 BPM
EKG P AXIS: 57 DEGREES
EKG P-R INTERVAL: 142 MS
EKG Q-T INTERVAL: 366 MS
EKG QRS DURATION: 68 MS
EKG QTC CALCULATION (BAZETT): 437 MS
EKG R AXIS: 21 DEGREES
EKG T AXIS: 25 DEGREES
EKG VENTRICULAR RATE: 86 BPM
GFR AFRICAN AMERICAN: >60
GFR NON-AFRICAN AMERICAN: >60 ML/MIN/1.73
GLUCOSE BLD-MCNC: 107 MG/DL (ref 74–99)
HCT VFR BLD CALC: 27.9 % (ref 34–48)
HEMOGLOBIN: 8.3 G/DL (ref 11.5–15.5)
MCH RBC QN AUTO: 28.4 PG (ref 26–35)
MCHC RBC AUTO-ENTMCNC: 29.7 % (ref 32–34.5)
MCV RBC AUTO: 95.5 FL (ref 80–99.9)
PDW BLD-RTO: 18.1 FL (ref 11.5–15)
PLATELET # BLD: 324 E9/L (ref 130–450)
PMV BLD AUTO: 9.1 FL (ref 7–12)
POTASSIUM SERPL-SCNC: 3.1 MMOL/L (ref 3.5–5)
RBC # BLD: 2.92 E12/L (ref 3.5–5.5)
SODIUM BLD-SCNC: 139 MMOL/L (ref 132–146)
TOTAL PROTEIN: 5.3 G/DL (ref 6.4–8.3)
WBC # BLD: 11.7 E9/L (ref 4.5–11.5)

## 2019-04-05 PROCEDURE — 85027 COMPLETE CBC AUTOMATED: CPT

## 2019-04-05 PROCEDURE — 80053 COMPREHEN METABOLIC PANEL: CPT

## 2019-04-05 PROCEDURE — 2060000000 HC ICU INTERMEDIATE R&B

## 2019-04-05 PROCEDURE — 6370000000 HC RX 637 (ALT 250 FOR IP): Performed by: INTERNAL MEDICINE

## 2019-04-05 PROCEDURE — 36415 COLL VENOUS BLD VENIPUNCTURE: CPT

## 2019-04-05 RX ADMIN — ITRACONAZOLE 200 MG: 100 CAPSULE ORAL at 08:36

## 2019-04-05 RX ADMIN — MIRTAZAPINE 15 MG: 15 TABLET, FILM COATED ORAL at 20:27

## 2019-04-05 RX ADMIN — PREDNISONE 10 MG: 5 TABLET ORAL at 08:36

## 2019-04-05 RX ADMIN — CHOLECALCIFEROL TAB 50 MCG (2000 UNIT) 2000 UNITS: 50 TAB at 08:36

## 2019-04-05 RX ADMIN — METOPROLOL SUCCINATE 25 MG: 25 TABLET, EXTENDED RELEASE ORAL at 08:36

## 2019-04-05 RX ADMIN — LEVOTHYROXINE SODIUM 50 MCG: 50 TABLET ORAL at 08:36

## 2019-04-05 RX ADMIN — ITRACONAZOLE 200 MG: 100 CAPSULE ORAL at 20:27

## 2019-04-05 ASSESSMENT — PAIN SCALES - GENERAL
PAINLEVEL_OUTOF10: 0

## 2019-04-05 NOTE — H&P
[id number] [address]                              HISTORY AND PHYSICAL    PATIENT NAME: Nick Ivy                     :        1928  MED REC NO:   [id number]                            ROOM:       6142  ACCOUNT NO:   [de-identified]                           ADMIT DATE: 2019  PROVIDER:     Frances Lutz MD    CHIEF COMPLAINT:  Rectal bleeding. HISTORY OF PRESENT ILLNESS:  This is a 80-year-old woman who was in a  nursing facility, where she was involved in subacute rehab following an  admission in February that was associated with aspiration and cavitary  pneumonia. She had been treated with a prolonged course of Augmentin,  which she has completed. She is also on itraconazole for possible  fungal involvement with some positive fungal markers. She follows with  Infectious Disease as an outpatient. The patient reportedly had been  doing better with improving respiratory status until this morning when  she developed several bouts of bright red blood from her rectum. This  was painless and she reports no abdominal pain, nausea, or vomiting. No  fever or chills. Her hemoglobin on admission was normal, stable, but we  will have to watch this closely. She is on Eliquis for history of  peripheral vascular disease and thromboembolectomy and it appears that  she is also receiving an aspirin. These will both be held for the time  being. Otherwise, the patient voices no complaints at this time. PAST MEDICAL HISTORY:  Significant for inflammatory polyarthritis,  hypertension, hypothyroidism, peripheral vascular disease with a history  of right femoral thromboembolectomy. MEDICATIONS:  Include Eliquis 2.5 mg b.i.d., aspirin 81 mg daily,  Remeron 15 mg at bedtime, prednisone 10 mg daily, metoprolol ER 25 mg  daily, levothyroxine 50 mcg daily, and itraconazole 200 mg b.i.d.     PAST SURGICAL HISTORY: Includes a total hip arthroplasty, history of  hysterectomy, appendectomy. FAMILY HISTORY:  Noncontributory. SOCIAL HISTORY:  She does not smoke. She does not drink any alcohol and  she is currently a resident in a nursing facility receiving subacute  rehab. REVIEW OF SYSTEMS:  SKIN:  Reveals no new or changing moles, rashes, or lesions. LUNGS:  She does have some mild shortness of breath, but this has been  improving, minimal cough. She was recently treated for a cavitary  pneumonia, which was felt to be aspiration pneumonia. There was some  question of possible fungal involvement as well as a possible  granulomatosis with polyangiitis. She was initially on prednisone for  this possibility and has remained on a slowly tapering dose of  prednisone. GASTROINTESTINAL:  No nausea, vomiting, diarrhea, or constipation. Positive hematochezia with bright red blood noted from the rectum this  morning. She has never had previous similar symptoms. GENITOURINARY:  No dysuria, hematuria, frequency, or problems with  recurrent UTIs. MUSCULOSKELETAL:  She does have some osteoarthritis complaints with a  history of inflammatory arthritis, involved mostly her wrist and hand in  the past.  She was previously on the [place], but this was stopped last  admission with the possible infection. NEUROLOGIC:  No history of CVA or TIA symptoms. No paresthesias. No  headaches. PHYSICAL EXAMINATION:  GENERAL:  This is an elderly woman lying in bed comfortably. She is not  in any distress. VITAL SIGNS:  Stable. She is afebrile. Monitor reveals her to be in  atrial fibrillation with a controlled heart rate in the 80s. HEENT:  Head:  Normocephalic, atraumatic. Eyes:  PERRLA. Extraocular  movements intact without nystagmus. Throat:  Oral and buccal mucosa are  moist without oral ulcer, exudate, or lesion. NECK:  No goiter, bruit, or lymphadenopathy. CHEST:  Symmetrical excursion with inspiration.   BACK:  No CVA or spine tenderness. HEART:  Has an irregularly irregular rhythm consistent with atrial  fibrillation and 2/6 systolic murmur. No JVD. LUNGS:  Fairly clear with no obvious rhonchi and no wheezes or rales. No use of accessory respiratory muscles. ABDOMEN:  Soft, nontender. Positive bowel sounds in all four quadrants. No hepatosplenomegaly or other masses appreciated. EXTREMITIES:  No clubbing, cyanosis, or edema. 2+ pulses with full  range of motion of all extremities. No active synovitis appreciated on  exam.  NEUROLOGIC:  Cranial nerves II through XII are grossly intact with a  grossly normal sensory exam.  5/5 strength throughout. Her gait was not  assessed at this time. ASSESSMENT AND PLAN:  1. Acute lower GI bleeding. Currently, her hemoglobin is stable, but  this may change. Diverticular bleed or angiodysplasia is the most  likely cause. We will continue to monitor her for recurrent bleeding  and follow her blood counts. Surgery has been consulted. 2.  Aspiration pneumonia/cavitary lung mass. This is felt to be due to  aspiration. She has recently completed a prolonged course of Augmentin  and is also completing a prolonged course of itraconazole, which was  initially placed for positive fungal markers. She is followed as an  outpatient by Infectious Disease. Overall, her lung exam has improved  over time and she is much less short of breath with diminished cough. 3.  Hypertension. This is well controlled. We will continue on her  usual medicines. 4.  Chronic anemia. Her hemoglobin is stable with no evidence of blood  loss, but this bleeding just started this morning, we still may yet see  a decline in her hemoglobin. We will follow. 5.  Chronic atrial fibrillation. Her heart rate is controlled. She is  on Eliquis for history of femoral thromboembolectomy, but also has  atrial fibrillation and has been on Eliquis for CVA prophylaxis.   Her  Eliquis is currently on hold as well as her aspirin due to her lower GI  bleeding. 6.  Lastly, she has a history of inflammatory polyarthritis. She was  previously on [place] for this. She is off all immunotherapy or  immunomodulators at this time. She is receiving no medication for  arthritis. She has not had any recurrence of symptoms since coming off  [place]. She has been on tapering doses of prednisone for the  possibility of granulomatosis with polyangiitis. This may also be  helping to suppress her inflammatory arthritis.         Roma Bravo MD    D: 04/04/2019 15:40:41       T: 04/04/2019 21:08:14     [email or web address]  Job#: [id number]     Doc#: [id number]    CC:

## 2019-04-05 NOTE — PROGRESS NOTES
Occupational Therapy  OT order received and chart reviewed. OT evaluation attempted. Pt declines any EOB or OOB activity despite encouragement from therapist and family member. Will reattempt another time/date as indicated. Thank you.     Leo Chadwick OTR/L  [id number]

## 2019-04-05 NOTE — CONSULTS
Full consult to follow  Hematochezia reported. Suspect diverticular  Follow h/h  c-scope?     Betty Kerns MD

## 2019-04-05 NOTE — PROGRESS NOTES
GENERAL SURGERY  DAILY PROGRESS NOTE  4/5/2019    Subjective:  No acute events  Passed clots yesterday but no bleeding overnight per nursing  No abdominal pain  No N/V    Objective:  BP 98/62   Pulse 89   Temp 98.5 °F (36.9 °C) (Axillary)   Resp 18   Ht 5' 4\" (1.626 m)   Wt 130 lb 9.6 oz (59.2 kg)   SpO2 92%   BMI 22.42 kg/m²     General Appearance:  in no acute distress, frail appearing. Skin:  Positives: no jaundice or rashes.  Areas of ecchymosis scattered  Head/face:  atraumatic  Eyes:  EOMI  Lungs:  Breathing Pattern: regular, no distress  Heart:   RRR  Abdomen:  Supra pubic tenderness, soft, non distended  Extremities: pale      Assessment/Plan:  80 y.o. female with hematochezia and anemia    Transfuse prn per medical team: Hgb reduced to 8.3 from 10 yesterday  NPO  IVF  Consider scope      Electronically signed by Concetta Romero DO on 4/5/2019 at 6:40 AM

## 2019-04-05 NOTE — PROGRESS NOTES
Physical Therapy    Facility/Department: [address] INTERNAL MEDICINE 2      NAME: Lachelle Perez  : 1928  MRN: [id number]    Date of Service: 2019    Attempted to see PT for pt session, pt declined at this time.   Lavern PT [id number]

## 2019-04-06 PROBLEM — E44.1 MILD PROTEIN-CALORIE MALNUTRITION (HCC): Status: ACTIVE | Noted: 2019-04-06

## 2019-04-06 LAB
ALBUMIN SERPL-MCNC: 2.1 G/DL (ref 3.5–5.2)
ALP BLD-CCNC: 66 U/L (ref 35–104)
ALT SERPL-CCNC: 6 U/L (ref 0–32)
ANION GAP SERPL CALCULATED.3IONS-SCNC: 11 MMOL/L (ref 7–16)
AST SERPL-CCNC: 8 U/L (ref 0–31)
BILIRUB SERPL-MCNC: 0.5 MG/DL (ref 0–1.2)
BUN BLDV-MCNC: 17 MG/DL (ref 8–23)
CALCIUM SERPL-MCNC: 8 MG/DL (ref 8.6–10.2)
CHLORIDE BLD-SCNC: 103 MMOL/L (ref 98–107)
CO2: 29 MMOL/L (ref 22–29)
CREAT SERPL-MCNC: 0.4 MG/DL (ref 0.5–1)
GFR AFRICAN AMERICAN: >60
GFR NON-AFRICAN AMERICAN: >60 ML/MIN/1.73
GLUCOSE BLD-MCNC: 111 MG/DL (ref 74–99)
HCT VFR BLD CALC: 26.8 % (ref 34–48)
HEMOGLOBIN: 8.1 G/DL (ref 11.5–15.5)
MCH RBC QN AUTO: 28.6 PG (ref 26–35)
MCHC RBC AUTO-ENTMCNC: 30.2 % (ref 32–34.5)
MCV RBC AUTO: 94.7 FL (ref 80–99.9)
PDW BLD-RTO: 17.7 FL (ref 11.5–15)
PLATELET # BLD: 376 E9/L (ref 130–450)
PMV BLD AUTO: 9.4 FL (ref 7–12)
POTASSIUM SERPL-SCNC: 3.4 MMOL/L (ref 3.5–5)
RBC # BLD: 2.83 E12/L (ref 3.5–5.5)
SODIUM BLD-SCNC: 143 MMOL/L (ref 132–146)
TOTAL PROTEIN: 5.3 G/DL (ref 6.4–8.3)
WBC # BLD: 12.5 E9/L (ref 4.5–11.5)

## 2019-04-06 PROCEDURE — 36415 COLL VENOUS BLD VENIPUNCTURE: CPT

## 2019-04-06 PROCEDURE — 80053 COMPREHEN METABOLIC PANEL: CPT

## 2019-04-06 PROCEDURE — 85027 COMPLETE CBC AUTOMATED: CPT

## 2019-04-06 PROCEDURE — 6370000000 HC RX 637 (ALT 250 FOR IP): Performed by: INTERNAL MEDICINE

## 2019-04-06 PROCEDURE — 2060000000 HC ICU INTERMEDIATE R&B

## 2019-04-06 RX ORDER — POTASSIUM BICARBONATE 25 MEQ/1
25 TABLET, EFFERVESCENT ORAL 2 TIMES DAILY
Status: DISCONTINUED | OUTPATIENT
Start: 2019-04-06 | End: 2019-04-06 | Stop reason: CLARIF

## 2019-04-06 RX ADMIN — ITRACONAZOLE 200 MG: 100 CAPSULE ORAL at 20:37

## 2019-04-06 RX ADMIN — POTASSIUM BICARBONATE 20 MEQ: 782 TABLET, EFFERVESCENT ORAL at 09:28

## 2019-04-06 RX ADMIN — MIRTAZAPINE 15 MG: 15 TABLET, FILM COATED ORAL at 20:37

## 2019-04-06 RX ADMIN — CHOLECALCIFEROL TAB 50 MCG (2000 UNIT) 2000 UNITS: 50 TAB at 09:29

## 2019-04-06 RX ADMIN — ITRACONAZOLE 200 MG: 100 CAPSULE ORAL at 09:28

## 2019-04-06 RX ADMIN — METOPROLOL SUCCINATE 25 MG: 25 TABLET, EXTENDED RELEASE ORAL at 09:29

## 2019-04-06 RX ADMIN — FLUTICASONE PROPIONATE 1 SPRAY: 50 SPRAY, METERED NASAL at 09:30

## 2019-04-06 RX ADMIN — LEVOTHYROXINE SODIUM 50 MCG: 50 TABLET ORAL at 09:29

## 2019-04-06 RX ADMIN — PREDNISONE 10 MG: 5 TABLET ORAL at 09:29

## 2019-04-06 ASSESSMENT — PAIN SCALES - GENERAL
PAINLEVEL_OUTOF10: 0

## 2019-04-06 NOTE — PROGRESS NOTES
appetite PTA and eating 26-50% currently)  · Anthropometric Measures:  · Ht: 5' 4\" (162.6 cm)   · Current Body Wt: 132 lb (59.9 kg)(4/6 bedwt)  · Admission Body Wt: 145 lb (65.8 kg)  · Usual Body Wt: 136 lb (61.7 kg)(per EMR x 3 mos at PCP office-wt 145# on last admit with edema)  · % Weight Change:  ,  none significant - fluctations with fluid status  · Ideal Body Wt: 120 lb (54.4 kg), % Ideal Body 110%  · BMI Classification: BMI 18.5 - 24.9 Normal Weight    Nutrition Interventions:   Continue current diet, Start ONS  Continued Inpatient Monitoring, Education Not Indicated, Coordination of Community Care    Nutrition Evaluation:   · Evaluation: Goals set   · Goals: PO >50% at meals/ONS, stable dry wt,     · Monitoring: Meal Intake, Supplement Intake, Skin Integrity, I&O, Weight, Pertinent Labs, Monitor Hemodynamic Status, Monitor Bowel Function      Electronically signed by Josefina Weaver RD, CNSC, LD on 4/6/19 at 12:14 PM    Contact Number: [phone]

## 2019-04-06 NOTE — PROGRESS NOTES
Subjective: The patient is awake and alert. No problems overnight. Denies chest pain, angina, and dyspnea. Denies abdominal pain. Tolerating diet. No nausea or vomiting. Objective:    /74   Pulse 65   Temp 98.2 °F (36.8 °C) (Oral)   Resp 18   Ht 5' 4\" (1.626 m)   Wt 130 lb 9.6 oz (59.2 kg)   SpO2 94%   BMI 22.42 kg/m²     Heart:  RRR, no murmurs, gallops, or rubs.   Lungs:  CTA bilaterally, no wheeze, rales or rhonchi  Abd: bowel sounds present, nontender, nondistended, no masses  Extrem:  No clubbing, cyanosis, or edema    CBC with Differential:    Lab Results   Component Value Date    WBC 12.5 04/06/2019    RBC 2.83 04/06/2019    HGB 8.1 04/06/2019    HCT 26.8 04/06/2019     04/06/2019    MCV 94.7 04/06/2019    MCH 28.6 04/06/2019    MCHC 30.2 04/06/2019    RDW 17.7 04/06/2019    LYMPHOPCT 11.2 04/04/2019    MONOPCT 4.6 04/04/2019    BASOPCT 0.2 04/04/2019    MONOSABS 0.57 04/04/2019    LYMPHSABS 1.38 04/04/2019    EOSABS 0.04 04/04/2019    BASOSABS 0.02 04/04/2019     CMP:    Lab Results   Component Value Date     04/06/2019    K 3.4 04/06/2019    K 4.5 02/23/2019     04/06/2019    CO2 29 04/06/2019    BUN 17 04/06/2019    CREATININE 0.4 04/06/2019    GFRAA >60 04/06/2019    LABGLOM >60 04/06/2019    GLUCOSE 111 04/06/2019    PROT 5.3 04/06/2019    LABALBU 2.1 04/06/2019    CALCIUM 8.0 04/06/2019    BILITOT 0.5 04/06/2019    ALKPHOS 66 04/06/2019    AST 8 04/06/2019    ALT 6 04/06/2019     PT/INR:    Lab Results   Component Value Date    PROTIME 18.2 04/04/2019    INR 1.6 04/04/2019     @cktotal:3,ckmb:3,ckmbindex:3,troponini:3@  U/A:    Lab Results   Component Value Date    NITRU Negative 02/23/2019    COLORU Yellow 02/23/2019    PHUR 6.0 02/23/2019    LABCAST RARE 01/11/2017    WBCUA 0-1 02/23/2019    RBCUA 0-1 02/23/2019    MUCUS Present 02/23/2019    BACTERIA MANY 02/23/2019    CLARITYU SL CLOUDY 02/23/2019    SPECGRAV <=1.005 02/23/2019    UROBILINOGEN 2.0 02/23/2019

## 2019-04-07 LAB
ALBUMIN SERPL-MCNC: 2.4 G/DL (ref 3.5–5.2)
ALP BLD-CCNC: 69 U/L (ref 35–104)
ALT SERPL-CCNC: 7 U/L (ref 0–32)
ANION GAP SERPL CALCULATED.3IONS-SCNC: 9 MMOL/L (ref 7–16)
AST SERPL-CCNC: 7 U/L (ref 0–31)
BILIRUB SERPL-MCNC: 0.4 MG/DL (ref 0–1.2)
BUN BLDV-MCNC: 18 MG/DL (ref 8–23)
CALCIUM SERPL-MCNC: 8.2 MG/DL (ref 8.6–10.2)
CHLORIDE BLD-SCNC: 99 MMOL/L (ref 98–107)
CO2: 33 MMOL/L (ref 22–29)
CREAT SERPL-MCNC: 0.5 MG/DL (ref 0.5–1)
GFR AFRICAN AMERICAN: >60
GFR NON-AFRICAN AMERICAN: >60 ML/MIN/1.73
GLUCOSE BLD-MCNC: 115 MG/DL (ref 74–99)
HCT VFR BLD CALC: 25.7 % (ref 34–48)
HEMOGLOBIN: 7.7 G/DL (ref 11.5–15.5)
MCH RBC QN AUTO: 28.6 PG (ref 26–35)
MCHC RBC AUTO-ENTMCNC: 30 % (ref 32–34.5)
MCV RBC AUTO: 95.5 FL (ref 80–99.9)
PDW BLD-RTO: 17.6 FL (ref 11.5–15)
PLATELET # BLD: 383 E9/L (ref 130–450)
PMV BLD AUTO: 9.4 FL (ref 7–12)
POTASSIUM SERPL-SCNC: 3.3 MMOL/L (ref 3.5–5)
RBC # BLD: 2.69 E12/L (ref 3.5–5.5)
SODIUM BLD-SCNC: 141 MMOL/L (ref 132–146)
TOTAL PROTEIN: 5.6 G/DL (ref 6.4–8.3)
WBC # BLD: 12.7 E9/L (ref 4.5–11.5)

## 2019-04-07 PROCEDURE — 6360000002 HC RX W HCPCS: Performed by: INTERNAL MEDICINE

## 2019-04-07 PROCEDURE — 2580000003 HC RX 258: Performed by: INTERNAL MEDICINE

## 2019-04-07 PROCEDURE — 85027 COMPLETE CBC AUTOMATED: CPT

## 2019-04-07 PROCEDURE — 36415 COLL VENOUS BLD VENIPUNCTURE: CPT

## 2019-04-07 PROCEDURE — 2060000000 HC ICU INTERMEDIATE R&B

## 2019-04-07 PROCEDURE — 6370000000 HC RX 637 (ALT 250 FOR IP): Performed by: INTERNAL MEDICINE

## 2019-04-07 PROCEDURE — 80053 COMPREHEN METABOLIC PANEL: CPT

## 2019-04-07 RX ORDER — POTASSIUM BICARBONATE 25 MEQ/1
25 TABLET, EFFERVESCENT ORAL 2 TIMES DAILY
Status: DISCONTINUED | OUTPATIENT
Start: 2019-04-07 | End: 2019-04-07 | Stop reason: CLARIF

## 2019-04-07 RX ORDER — PANTOPRAZOLE SODIUM 40 MG/1
40 TABLET, DELAYED RELEASE ORAL
Status: DISCONTINUED | OUTPATIENT
Start: 2019-04-07 | End: 2019-04-12 | Stop reason: HOSPADM

## 2019-04-07 RX ADMIN — SODIUM CHLORIDE 25 MG: 9 INJECTION, SOLUTION INTRAVENOUS at 09:41

## 2019-04-07 RX ADMIN — PREDNISONE 10 MG: 5 TABLET ORAL at 08:07

## 2019-04-07 RX ADMIN — SODIUM CHLORIDE 100 MG: 9 INJECTION, SOLUTION INTRAVENOUS at 13:01

## 2019-04-07 RX ADMIN — ITRACONAZOLE 200 MG: 100 CAPSULE ORAL at 21:34

## 2019-04-07 RX ADMIN — MIRTAZAPINE 15 MG: 15 TABLET, FILM COATED ORAL at 21:35

## 2019-04-07 RX ADMIN — POTASSIUM BICARBONATE 20 MEQ: 782 TABLET, EFFERVESCENT ORAL at 08:08

## 2019-04-07 RX ADMIN — ITRACONAZOLE 200 MG: 100 CAPSULE ORAL at 08:07

## 2019-04-07 RX ADMIN — LEVOTHYROXINE SODIUM 50 MCG: 50 TABLET ORAL at 08:07

## 2019-04-07 RX ADMIN — FLUTICASONE PROPIONATE 1 SPRAY: 50 SPRAY, METERED NASAL at 08:08

## 2019-04-07 RX ADMIN — ENOXAPARIN SODIUM 40 MG: 40 INJECTION SUBCUTANEOUS at 13:01

## 2019-04-07 RX ADMIN — PANTOPRAZOLE SODIUM 40 MG: 40 TABLET, DELAYED RELEASE ORAL at 09:52

## 2019-04-07 RX ADMIN — POTASSIUM BICARBONATE 20 MEQ: 782 TABLET, EFFERVESCENT ORAL at 21:34

## 2019-04-07 RX ADMIN — METOPROLOL SUCCINATE 25 MG: 25 TABLET, EXTENDED RELEASE ORAL at 08:07

## 2019-04-07 RX ADMIN — CHOLECALCIFEROL TAB 50 MCG (2000 UNIT) 2000 UNITS: 50 TAB at 08:08

## 2019-04-07 ASSESSMENT — PAIN SCALES - GENERAL
PAINLEVEL_OUTOF10: 0

## 2019-04-07 NOTE — PROGRESS NOTES
Dr. Pedrito Lewis notified of consult/text message left thru [employer]/voice mail  Andrea Espana 4/7/2019 9:09 AM

## 2019-04-07 NOTE — PROGRESS NOTES
Per Dr. Diamond Jang request for information eye care per family or facility. Spoke with [employer] regarding pt eye care. Stated pt was to apply warm compresses to eyes and that she was prescribed 100mg Doxycycline PO BID for ten days from 4/3-4/13. Pt admitted to [facility] 4/4 and has not received any antibiotics. Left detailed message for Dr. Diamond Jang regarding pt eye care at facility.

## 2019-04-07 NOTE — PROGRESS NOTES
Subjective: The patient is awake and alert. No problems overnight. Denies chest pain, angina, and dyspnea. Denies abdominal pain. Tolerating diet. No nausea or vomiting. Objective:    /65   Pulse 82   Temp 98.5 °F (36.9 °C) (Oral)   Resp 16   Ht 5' 4\" (1.626 m)   Wt 130 lb 11.2 oz (59.3 kg)   SpO2 98%   BMI 22.43 kg/m²     Heart:  RRR, no murmurs, gallops, or rubs.   Lungs:  CTA bilaterally, no wheeze, rales or rhonchi  Abd: bowel sounds present, nontender, nondistended, no masses  Extrem:  No clubbing, cyanosis, or edema    CBC with Differential:    Lab Results   Component Value Date    WBC 12.7 04/07/2019    RBC 2.69 04/07/2019    HGB 7.7 04/07/2019    HCT 25.7 04/07/2019     04/07/2019    MCV 95.5 04/07/2019    MCH 28.6 04/07/2019    MCHC 30.0 04/07/2019    RDW 17.6 04/07/2019    LYMPHOPCT 11.2 04/04/2019    MONOPCT 4.6 04/04/2019    BASOPCT 0.2 04/04/2019    MONOSABS 0.57 04/04/2019    LYMPHSABS 1.38 04/04/2019    EOSABS 0.04 04/04/2019    BASOSABS 0.02 04/04/2019     CMP:    Lab Results   Component Value Date     04/07/2019    K 3.3 04/07/2019    K 4.5 02/23/2019    CL 99 04/07/2019    CO2 33 04/07/2019    BUN 18 04/07/2019    CREATININE 0.5 04/07/2019    GFRAA >60 04/07/2019    LABGLOM >60 04/07/2019    GLUCOSE 115 04/07/2019    PROT 5.6 04/07/2019    LABALBU 2.4 04/07/2019    CALCIUM 8.2 04/07/2019    BILITOT 0.4 04/07/2019    ALKPHOS 69 04/07/2019    AST 7 04/07/2019    ALT 7 04/07/2019     PT/INR:    Lab Results   Component Value Date    PROTIME 18.2 04/04/2019    INR 1.6 04/04/2019     @cktotal:3,ckmb:3,ckmbindex:3,troponini:3@  U/A:    Lab Results   Component Value Date    NITRU Negative 02/23/2019    COLORU Yellow 02/23/2019    PHUR 6.0 02/23/2019    LABCAST RARE 01/11/2017    WBCUA 0-1 02/23/2019    RBCUA 0-1 02/23/2019    MUCUS Present 02/23/2019    BACTERIA MANY 02/23/2019    CLARITYU SL CLOUDY 02/23/2019    SPECGRAV <=1.005 02/23/2019    UROBILINOGEN 2.0 02/23/2019 BILIRUBINUR Negative 02/23/2019    BLOODU MODERATE 02/23/2019    GLUCOSEU Negative 02/23/2019    KETUA Negative 02/23/2019    AMORPHOUS MODERATE 02/23/2019        Assessment:    Patient Active Problem List   Diagnosis    Septic arthritis of wrist ([facility] [address]    Hyponatremia    Essential hypertension, benign    Chondrocalcinosis    Leukocytosis    Carotid bruit    PVD (peripheral vascular disease) with claudication (HCC)    Inflammatory polyarthritis (HCC)    Lung mass    Aspiration pneumonia (HCC)    GI bleed    Lower GI bleed    Mild protein-calorie malnutrition (HCC)       Plan:    Lower gI bleed-  HB down to 7.7  Will give iron today  Will need to answer question of can restart anticoagulation  Replace k      Manny King DO  6:49 AM  4/7/2019

## 2019-04-07 NOTE — CONSULTS
94%   Weight: 130 lb 11.2 oz (59.3 kg)      Height:              SKIN:  Skin is intact and well hydrated to the bilateral lower extremities. No rashes lesions or ulcers. NAILS:  Nails 1-5 on the right and left are thickened, elongated and discolored dystrophic incurvated and painful with palpation  NEUROLOGIC:  Protective sensation is  grossly intact  VASCULAR:  DP and PT pulses are palpable. CFT less than 3 seconds. Warm to warm from the tibial tuberosity to the distal aspect of the digits dorsally. Hair growth noted to the distal aspects dorsally. MUSCULOSKELETAL: Rectus foot type     LABS:  CBC with Differential:    Lab Results   Component Value Date    WBC 12.7 04/07/2019    RBC 2.69 04/07/2019    HGB 7.7 04/07/2019    HCT 25.7 04/07/2019     04/07/2019    MCV 95.5 04/07/2019    MCH 28.6 04/07/2019    MCHC 30.0 04/07/2019    RDW 17.6 04/07/2019    LYMPHOPCT 11.2 04/04/2019    MONOPCT 4.6 04/04/2019    BASOPCT 0.2 04/04/2019    MONOSABS 0.57 04/04/2019    LYMPHSABS 1.38 04/04/2019    EOSABS 0.04 04/04/2019    BASOSABS 0.02 04/04/2019     Hemoglobin/Hematocrit:    Lab Results   Component Value Date    HGB 7.7 04/07/2019    HCT 25.7 04/07/2019       DIAGNOSTICS:      ASSESSMENT:  Tinea unguium  Pain in right toes and left toes  PVD        PLAN:  Initial examination and evaluation  Reviewed ancillary service notes and pertinent diagnostics  Nails 1-5 on the right and left debrided to wound bed without incident and to the patients satisfaction. Nails debrided in both length and thickness to relieve pain and decrease risk of infection and or ulceration  OK to d/c from Podiatry service.  Reconsult if any other podiatric issues

## 2019-04-08 ENCOUNTER — APPOINTMENT (OUTPATIENT)
Dept: CT IMAGING | Age: 84
DRG: 377 | End: 2019-04-08
Payer: MEDICARE

## 2019-04-08 LAB
ALBUMIN SERPL-MCNC: 2.3 G/DL (ref 3.5–5.2)
ALP BLD-CCNC: 71 U/L (ref 35–104)
ALT SERPL-CCNC: 10 U/L (ref 0–32)
ANION GAP SERPL CALCULATED.3IONS-SCNC: 8 MMOL/L (ref 7–16)
AST SERPL-CCNC: 15 U/L (ref 0–31)
BILIRUB SERPL-MCNC: 0.4 MG/DL (ref 0–1.2)
BUN BLDV-MCNC: 16 MG/DL (ref 8–23)
CALCIUM SERPL-MCNC: 8.3 MG/DL (ref 8.6–10.2)
CHLORIDE BLD-SCNC: 101 MMOL/L (ref 98–107)
CO2: 35 MMOL/L (ref 22–29)
CREAT SERPL-MCNC: 0.5 MG/DL (ref 0.5–1)
GFR AFRICAN AMERICAN: >60
GFR NON-AFRICAN AMERICAN: >60 ML/MIN/1.73
GLUCOSE BLD-MCNC: 100 MG/DL (ref 74–99)
HCT VFR BLD CALC: 27.1 % (ref 34–48)
HEMOGLOBIN: 8 G/DL (ref 11.5–15.5)
MCH RBC QN AUTO: 27.9 PG (ref 26–35)
MCHC RBC AUTO-ENTMCNC: 29.5 % (ref 32–34.5)
MCV RBC AUTO: 94.4 FL (ref 80–99.9)
PDW BLD-RTO: 17.7 FL (ref 11.5–15)
PLATELET # BLD: 385 E9/L (ref 130–450)
PMV BLD AUTO: 9.3 FL (ref 7–12)
POTASSIUM SERPL-SCNC: 4 MMOL/L (ref 3.5–5)
RBC # BLD: 2.87 E12/L (ref 3.5–5.5)
SODIUM BLD-SCNC: 144 MMOL/L (ref 132–146)
TOTAL PROTEIN: 5.7 G/DL (ref 6.4–8.3)
WBC # BLD: 13.3 E9/L (ref 4.5–11.5)

## 2019-04-08 PROCEDURE — 36415 COLL VENOUS BLD VENIPUNCTURE: CPT

## 2019-04-08 PROCEDURE — 6360000002 HC RX W HCPCS: Performed by: INTERNAL MEDICINE

## 2019-04-08 PROCEDURE — 1200000000 HC SEMI PRIVATE

## 2019-04-08 PROCEDURE — 6370000000 HC RX 637 (ALT 250 FOR IP): Performed by: INTERNAL MEDICINE

## 2019-04-08 PROCEDURE — 85027 COMPLETE CBC AUTOMATED: CPT

## 2019-04-08 PROCEDURE — 71250 CT THORAX DX C-: CPT

## 2019-04-08 PROCEDURE — 80053 COMPREHEN METABOLIC PANEL: CPT

## 2019-04-08 PROCEDURE — 2580000003 HC RX 258: Performed by: INTERNAL MEDICINE

## 2019-04-08 RX ADMIN — METOPROLOL SUCCINATE 25 MG: 25 TABLET, EXTENDED RELEASE ORAL at 09:33

## 2019-04-08 RX ADMIN — ITRACONAZOLE 200 MG: 100 CAPSULE ORAL at 09:33

## 2019-04-08 RX ADMIN — FLUTICASONE PROPIONATE 1 SPRAY: 50 SPRAY, METERED NASAL at 09:33

## 2019-04-08 RX ADMIN — SODIUM CHLORIDE 125 MG: 900 INJECTION INTRAVENOUS at 06:48

## 2019-04-08 RX ADMIN — CHOLECALCIFEROL TAB 50 MCG (2000 UNIT) 2000 UNITS: 50 TAB at 09:33

## 2019-04-08 RX ADMIN — MIRTAZAPINE 15 MG: 15 TABLET, FILM COATED ORAL at 20:37

## 2019-04-08 RX ADMIN — LEVOTHYROXINE SODIUM 50 MCG: 50 TABLET ORAL at 09:33

## 2019-04-08 RX ADMIN — ITRACONAZOLE 200 MG: 100 CAPSULE ORAL at 20:37

## 2019-04-08 RX ADMIN — ENOXAPARIN SODIUM 40 MG: 40 INJECTION SUBCUTANEOUS at 09:33

## 2019-04-08 RX ADMIN — PREDNISONE 10 MG: 5 TABLET ORAL at 09:33

## 2019-04-08 RX ADMIN — PANTOPRAZOLE SODIUM 40 MG: 40 TABLET, DELAYED RELEASE ORAL at 06:48

## 2019-04-08 ASSESSMENT — PAIN SCALES - GENERAL
PAINLEVEL_OUTOF10: 0
PAINLEVEL_OUTOF10: 0

## 2019-04-08 NOTE — DISCHARGE INSTR - COC
Continuity of Care Form    Patient Name: Sebas Plunkett   :  1928  MRN:  [id number]    Admit date:  2019  Discharge date:  19    Code Status Order: DNR-CCA   Advance Directives:   Advance Care Flowsheet Documentation     Date/Time Healthcare Directive Type of Healthcare Directive Copy in [address] Agent's Name Healthcare Agent's Phone Number    19 1300  Yes, patient has an advance directive for healthcare treatment  Health care treatment directive  No, copy requested from family  Adult Children --  --          Admitting Physician:  Rut Gutiérrez MD  PCP: Rut Gutiérrez MD    Discharging Nurse: [facility] Unit/Room#: 5001/2670-W  Discharging Unit Phone Number:[phone]    Emergency Contact:   Extended Emergency Contact Information  Primary Emergency Contact: Amanda Tej  Address: [address] [place] [address]            [address] Phone: [phone]  Work Phone: [phone]  Mobile Phone: [phone]  Relation: Brother/Sister  Secondary Emergency Contact: Zaida Aylin  Address: [address] [place] [address] Phone: [phone]  Mobile Phone: [phone]  Relation: Brother/Sister    Past Surgical History:  Past Surgical History:   Procedure Laterality Date    APPENDECTOMY  1950    COLONOSCOPY      DILATATION, ESOPHAGUS N/A     GALLBLADDER REMOVED     EMBOLECTOMY Right 3-15    popliteal, tibial - Dr. Hernandez Arce        Immunization History: There is no immunization history on file for this patient.     Active Problems:  Patient Active Problem List   Diagnosis Code    Septic arthritis of wrist ([address] M00.9    Hyponatremia E87.1    Essential hypertension, benign I10    Chondrocalcinosis M11.20    Leukocytosis D72.829    Carotid bruit R09.89    PVD (peripheral vascular disease) with claudication ([facility]) I73.9    Inflammatory polyarthritis ([facility]) M06.4    Lung mass R91.8    Aspiration pneumonia ([facility]) J69.0    GI bleed K92.2    Lower GI bleed K92.2    Mild protein-calorie malnutrition ([facility]) E44.1       Isolation/Infection:   Isolation          No Isolation        Patient Infection Status     Infection Encounter Level? Onset Date Added Added By Resolved Resolved By Review Date    ESBL (Extended Spectrum Beta Lactamase) No  07/18/16 Wilfredo Diaz RN 01/12/17 Wilfredo Diaz RN     E. Coli urine 7/15/16  E. Coli blood 7/15/16          Nurse Assessment:  Last Vital Signs: /60   Pulse 90   Temp 97.8 °F (36.6 °C) (Oral)   Resp 16   Ht 5' 4\" (1.626 m)   Wt 134 lb 12.8 oz (61.1 kg)   SpO2 93%   BMI 23.14 kg/m²     Last documented pain score (0-10 scale): Pain Level: 0  Last Weight:   Wt Readings from Last 1 Encounters:   04/08/19 134 lb 12.8 oz (61.1 kg)     Mental Status:  oriented and alert    IV Access:  - None    Nursing Mobility/ADLs:  Walking   Assisted  Transfer  Assisted  Bathing  Assisted  Dressing  Assisted  Toileting  Assisted  Feeding  Independent  Med Admin  Assisted  Med Delivery   none    Wound Care Documentation and Therapy:        Elimination:  Continence:   · Bowel: Yes  · Bladder: Yes  Urinary Catheter: None   Colostomy/Ileostomy/Ileal Conduit: No       Date of Last BM: 4/12/19    Intake/Output Summary (Last 24 hours) at 4/8/2019 1039  Last data filed at 4/8/2019 0930  Gross per 24 hour   Intake 480 ml   Output 200 ml   Net 280 ml     I/O last 3 completed shifts: In: 12 [P.O.:560]  Out: 100 [Urine:100]    Safety Concerns: At Risk for Falls    Impairments/Disabilities:      None    Nutrition Therapy:  Current Nutrition Therapy:   - Oral Diet:  General    Routes of Feeding: Oral  Liquids:  Thin Liquids  Daily Fluid Restriction: no  Last Modified Barium Swallow with Video (Video Swallowing Test): not done    Treatments at the Time of Hospital

## 2019-04-08 NOTE — PROGRESS NOTES
Occupational Therapy  Date:4/8/2019  Patient Name: Nora Bennett  Room: 9076/7157-X     Occupational Therapy (OT) evaluation attempted this date; patient declined to participate in out of bed activities, despite provision of verbal encouragement and patient education. OT evaluation to be re-attempted at later date, as able/appropriate. Rowan Maguire, OTR/L  License Number: [id number]

## 2019-04-08 NOTE — PROGRESS NOTES
GENERAL SURGERY  DAILY PROGRESS NOTE  4/8/2019    Subjective:  No acute events  Resting comfortably this AM  No bloody BM o/n. Objective:  BP (!) 124/53   Pulse 80   Temp 98.1 °F (36.7 °C) (Oral)   Resp 16   Ht 5' 4\" (1.626 m)   Wt 134 lb 12.8 oz (61.1 kg)   SpO2 94%   BMI 23.14 kg/m²     General: NAD, awake and alert. Head: Normocephalic, atraumatic  Eyes: PERRLA, EOMI. No sclera icterus. Lungs: No increased work of breathing. Cardiovascular: RRR. Abdomen: Soft, ND, NT. No rebound, guarding or rigidity. Extremities: Atraumatic, full range of motion  Skin: Warm, dry and intact    Assessment/Plan:  80 y.o. female with lower GIB, most likely diverticular    Colonoscopy as an outpatient  Diet as tolerated  IVF's  Monitor Hgb. Transfuse as necessary per primary team for Hgb < 7.0.        Electronically signed by Mu Aguirre MD on 4/8/2019 at 7:10 AM

## 2019-04-08 NOTE — PROGRESS NOTES
Subjective: The patient is awake and alert. No problems overnight. Denies chest pain, angina, and dyspnea. Denies abdominal pain. Tolerating diet. No nausea or vomiting. Objective:    BP (!) 124/53   Pulse 80   Temp 98.1 °F (36.7 °C) (Oral)   Resp 16   Ht 5' 4\" (1.626 m)   Wt 134 lb 12.8 oz (61.1 kg)   SpO2 94%   BMI 23.14 kg/m²     Heart:  RRR, no murmurs, gallops, or rubs.   Lungs:  CTA bilaterally, no wheeze, rales or rhonchi  Abd: bowel sounds present, nontender, nondistended, no masses  Extrem:  No clubbing, cyanosis, or edema    CBC with Differential:    Lab Results   Component Value Date    WBC 13.3 04/08/2019    RBC 2.87 04/08/2019    HGB 8.0 04/08/2019    HCT 27.1 04/08/2019     04/08/2019    MCV 94.4 04/08/2019    MCH 27.9 04/08/2019    MCHC 29.5 04/08/2019    RDW 17.7 04/08/2019    LYMPHOPCT 11.2 04/04/2019    MONOPCT 4.6 04/04/2019    BASOPCT 0.2 04/04/2019    MONOSABS 0.57 04/04/2019    LYMPHSABS 1.38 04/04/2019    EOSABS 0.04 04/04/2019    BASOSABS 0.02 04/04/2019     CMP:    Lab Results   Component Value Date     04/08/2019    K 4.0 04/08/2019    K 4.5 02/23/2019     04/08/2019    CO2 35 04/08/2019    BUN 16 04/08/2019    CREATININE 0.5 04/08/2019    GFRAA >60 04/08/2019    LABGLOM >60 04/08/2019    GLUCOSE 100 04/08/2019    PROT 5.7 04/08/2019    LABALBU 2.3 04/08/2019    CALCIUM 8.3 04/08/2019    BILITOT 0.4 04/08/2019    ALKPHOS 71 04/08/2019    AST 15 04/08/2019    ALT 10 04/08/2019     PT/INR:    Lab Results   Component Value Date    PROTIME 18.2 04/04/2019    INR 1.6 04/04/2019     @cktotal:3,ckmb:3,ckmbindex:3,troponini:3@  U/A:    Lab Results   Component Value Date    NITRU Negative 02/23/2019    COLORU Yellow 02/23/2019    PHUR 6.0 02/23/2019    LABCAST RARE 01/11/2017    WBCUA 0-1 02/23/2019    RBCUA 0-1 02/23/2019    MUCUS Present 02/23/2019    BACTERIA MANY 02/23/2019    CLARITYU SL CLOUDY 02/23/2019    SPECGRAV <=1.005 02/23/2019    UROBILINOGEN 2.0 02/23/2019    BILIRUBINUR Negative 02/23/2019    BLOODU MODERATE 02/23/2019    GLUCOSEU Negative 02/23/2019    KETUA Negative 02/23/2019    AMORPHOUS MODERATE 02/23/2019        Assessment:    Patient Active Problem List   Diagnosis    Septic arthritis of wrist ([address]    Hyponatremia    Essential hypertension, benign    Chondrocalcinosis    Leukocytosis    Carotid bruit    PVD (peripheral vascular disease) with claudication (HCC)    Inflammatory polyarthritis (HCC)    Lung mass    Aspiration pneumonia (HCC)    GI bleed    Lower GI bleed    Mild protein-calorie malnutrition (HCC)       Plan:    GI bleed-  Hb stable  D/w surgery ok to restart anticaogulation    Cavitary pneumonia-  On itraconazole per ID apparent interaction with NOAC-need to know length of sporanox therapy  Also optho wants ABT for eye ?  Interaction there    Atrial fib-  Restart anticoagulation when practical as above    discharge [id number] [facility],   6:12 AM  4/8/2019

## 2019-04-08 NOTE — CONSULTS
Inpatient consult to Infectious Diseases  Consult performed by: Jessica Oneill MD  Consult ordered by: Naseem Mendoza DO        [address] [place] [address] [facility]  Consultation Note     Admit Date: 4/4/2019  8:15 AM    Reason for Consult:   ? Length of treatment for cavitary pneumonia; and? Antibiotic for eye    Attending Physician:  Clifford Haynes MD    HISTORY OF PRESENT ILLNESS:             The history is obtained from extensive review of available past medical records. The patient is a 80 y.o. female who is known to the ID service. The patient was admitted to [facility]. The patient had been treated for an upper respiratory tract infection by ENT with Amoxicillin as an outpatient. She made minimal improvement. She presented to the ED on 2/23/19 with a cough lasting for over 2 weeks. Treated with Ceftriaxone. Chest CT showed a few nodules and one of them in the left lower lobe was cavitating. Seen by ID and treated for cavitary pneumonia. Ceftriaxone was changed over to Unasyn. Itraconazole was started for possible fungal infection since the patient was onXeljanz for rheumatoid arthritis. 1-3 beta glucan was slightly positive. ANCA was also positive. Galactomannan was negative. Transthoracic fine needle aspiration showed prior granulomatous inflammation. Patient was discharged on Augmentin and Itraconazole. The patient was seen in follow-up on 3/21/19. She was to continue complete Augmentin until the end of March 2019. She was also instructed to continue Itraconazole ×3 months. The patient presented back to the ED on 4/4/19 with hematochezia. The patient is a poor historian and does not know why she is here. The patient was in consultation by surgery. She was observed. Hemoglobin stabilized so anticoagulation was restarted. Patient is still on Itraconazole. Today ID was asked to see her in consultation.  Apparently ophthalmology once some medication for her eye and there might be some potential drug to drug interaction. The patient apparently was on Doxycycline for unknown reasons at the nursing facility. 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 Admitted to [facility]. The patient had been treated for an upper respiratory tract infection by ENT with Amoxicillin as an outpatient. She made minimal improvement. She presented to the ED on 2/23/19 with a cough lasting for over 2 weeks. Treated with Ceftriaxone. Chest CT showed a few nodules and one of them in the left lower lobe was cavitating. Seen by ID and treated for cavitary pneumonia. Ceftriaxone was changed over to Unasyn. Itraconazole was started for possible fungal infection since the patient was onXeljanz for rheumatoid arthritis. 1-3 beta glucan was slightly positive. ANCA was also positive. Galactomannan was negative. Transthoracic fine needle aspiration showed pygranulomatous inflammation. Patient was discharged on Augmentin and Itraconazole. July 2016. Admitted to [facility] with burning on urination and night sweats. Seen by ID for gram-negative wayne bacteremia. Treated with Ceftriaxone. Urine cultures identified as ESBL + E. coli. Treated with Ertapenem and discharged home on oral Levofloxacin     February 2015. 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 Underwent right knee arthrocentesis. Seen by Dr. Samantha Harrington and treated with Unasyn which was stopped shortly after.  Because she was thought to have a dental abscess she was treated with Intimate partner violence:     Fear of current or ex partner: None     Emotionally abused: None     Physically abused: None     Forced sexual activity: None   Other Topics Concern    None   Social History Narrative    None      Family History:       Problem Relation Age of Onset    High Blood Pressure Mother     Arthritis Mother     Stroke Mother     Cancer Sister         breast    Stroke Maternal Aunt     High Blood Pressure Brother     Stroke Maternal Grandmother     Cancer Maternal Cousin    . Otherwise non-pertinent to the chief complaint. REVIEW OF SYSTEMS:    Constitutional: Negative for fevers, chills, diaphoresis  Neurologic: Negative   Psychiatric: Negative  Rheumatologic: Negative   Endocrine: Negative  Hematologic: Negative  Immunologic: Negative  ENT: As needed. I  Respiratory: As in HPI. Currently no symptoms  Cardiovascular: Negative  GI: Negative  : Negative  Musculoskeletal: As in the HPI. Currently off Xeljanz  Skin: No rashes. PHYSICAL EXAM:    Vitals:   BP (!) 124/53   Pulse 80   Temp 98.1 °F (36.7 °C) (Oral)   Resp 16   Ht 5' 4\" (1.626 m)   Wt 134 lb 12.8 oz (61.1 kg)   SpO2 94%   BMI 23.14 kg/m²   Constitutional: The patient is awake, alert, and oriented. She is hard of hearing. No distress. Skin: Warm and dry. No rashes were noted. HEENT: Eyes show round, and reactive pupils. No jaundice. Moist mucous membranes, no ulcerations, no thrush. Irregular, soft subcutaneous nodules over the inner canthus right eye. No erythema. Neck: Supple to movements. No lymphadenopathy. Chest: No use of accessory muscles to breathe. Symmetrical expansion. Auscultation reveals no wheezing, crackles, or rhonchi. Cardiovascular: S1 and S2 are rhythmic and regular. No murmurs appreciated. Abdomen: Positive bowel sounds to auscultation. Benign to palpation. No masses felt. No hepatosplenomegaly. Extremities: No clubbing, no cyanosis, no edema.   Lines: peripheral    CBC+dif:  Recent Labs     04/06/19  0405 04/07/19  0530 04/08/19  0412   WBC 12.5* 12.7* 13.3*   HGB 8.1* 7.7* 8.0*   HCT 26.8* 25.7* 27.1*   MCV 94.7 95.5 94.4    383 385     Lab Results   Component Value Date    CRP 16.0 (H) 02/21/2019    CRP 5.2 (H) 02/22/2018    CRP 2.8 (H) 08/17/2017      No results found for: CRP  Lab Results   Component Value Date    SEDRATE 110 (H) 02/25/2019    SEDRATE 105 (H) 02/24/2019    SEDRATE 136 (H) 02/21/2019     Lab Results   Component Value Date    ALT 10 04/08/2019    AST 15 04/08/2019    ALKPHOS 71 04/08/2019    BILITOT 0.4 04/08/2019     Lab Results   Component Value Date     04/08/2019    K 4.0 04/08/2019    K 4.5 02/23/2019     04/08/2019    CO2 35 04/08/2019    BUN 16 04/08/2019    CREATININE 0.5 04/08/2019    GFRAA >60 04/08/2019    LABGLOM >60 04/08/2019    GLUCOSE 100 04/08/2019    PROT 5.7 04/08/2019    LABALBU 2.3 04/08/2019    CALCIUM 8.3 04/08/2019    BILITOT 0.4 04/08/2019    ALKPHOS 71 04/08/2019    AST 15 04/08/2019    ALT 10 04/08/2019       Lab Results   Component Value Date    PROTIME 18.2 04/04/2019    INR 1.6 04/04/2019       Lab Results   Component Value Date    TSH 2.670 02/23/2019       Lab Results   Component Value Date    COLORU Yellow 02/23/2019    PHUR 6.0 02/23/2019    LABCAST RARE 01/11/2017    WBCUA 0-1 02/23/2019    RBCUA 0-1 02/23/2019    MUCUS Present 02/23/2019    BACTERIA MANY 02/23/2019    CLARITYU SL CLOUDY 02/23/2019    SPECGRAV <=1.005 02/23/2019    LEUKOCYTESUR TRACE 02/23/2019    UROBILINOGEN 2.0 02/23/2019    BILIRUBINUR Negative 02/23/2019    BLOODU MODERATE 02/23/2019    GLUCOSEU Negative 02/23/2019    AMORPHOUS MODERATE 02/23/2019       No results found for: HCO3, BE, O2SAT, PH, THGB, PCO2, PO2, TCO2  Radiology:  Noted    Microbiology:  Pending  No results for input(s): BC in the last 72 hours. No results for input(s): ORG in the last 72 hours. No results for input(s): Royetta Manner in the last 72 hours.   No results for input(s): STREPNEUMAGU in the last 72 hours. No results for input(s): LP1UAG in the last 72 hours. No results for input(s): ASO in the last 72 hours. No results for input(s): CULTRESP in the last 72 hours. No results for input(s): PROCAL in the last 72 hours. Assessment:  · Probable aspiration pneumonia. Patient completed a course of Augmentin  · Cavitary pneumonia associated to the above  · Possible fungal pneumonia. Had fine needle aspiration showing pyogranulomatous inflammation  · Inflammatory arthritis currently off biologicals  · Subcutaneous nodules right inner canthus. This does not look like an infectious process. Reportedly seen by ophthalmology. I could not find a note    Plan:    · Continue Itraconazole. Completing 6 out of a minimum of 12 weeks  · Check cultures, baseline ESR, CRP  · Repeat CT of the chest  · I do not see the need for the patient to be on Doxycycline  · Will follow with you    Thank you for having us see this patient in consultation. I will be discussing this case with the treating physicians.     Yani Andrade  7:29 AM  4/8/2019

## 2019-04-08 NOTE — PROGRESS NOTES
Physical Therapy    Facility/Department: [address] INTERNAL MEDICINE 2      NAME: Sebas Plunkett  : 1928  MRN: [id number]    Date of Service: 2019    Attempted to see pt for PT evaluation, pt refused, encouraged pt to participate, pt continued to refuse.   Yumiko Coleman PT [id number]

## 2019-04-09 LAB
C-REACTIVE PROTEIN: 10 MG/DL (ref 0–0.4)
HCT VFR BLD CALC: 28.3 % (ref 34–48)
HEMOGLOBIN: 8.2 G/DL (ref 11.5–15.5)
MCH RBC QN AUTO: 28 PG (ref 26–35)
MCHC RBC AUTO-ENTMCNC: 29 % (ref 32–34.5)
MCV RBC AUTO: 96.6 FL (ref 80–99.9)
PDW BLD-RTO: 18.1 FL (ref 11.5–15)
PLATELET # BLD: 425 E9/L (ref 130–450)
PMV BLD AUTO: 10 FL (ref 7–12)
RBC # BLD: 2.93 E12/L (ref 3.5–5.5)
SEDIMENTATION RATE, ERYTHROCYTE: 115 MM/HR (ref 0–20)
WBC # BLD: 14.9 E9/L (ref 4.5–11.5)

## 2019-04-09 PROCEDURE — 97530 THERAPEUTIC ACTIVITIES: CPT

## 2019-04-09 PROCEDURE — 97161 PT EVAL LOW COMPLEX 20 MIN: CPT

## 2019-04-09 PROCEDURE — 6370000000 HC RX 637 (ALT 250 FOR IP): Performed by: INTERNAL MEDICINE

## 2019-04-09 PROCEDURE — 86140 C-REACTIVE PROTEIN: CPT

## 2019-04-09 PROCEDURE — 1200000000 HC SEMI PRIVATE

## 2019-04-09 PROCEDURE — 85027 COMPLETE CBC AUTOMATED: CPT

## 2019-04-09 PROCEDURE — 85651 RBC SED RATE NONAUTOMATED: CPT

## 2019-04-09 PROCEDURE — 36415 COLL VENOUS BLD VENIPUNCTURE: CPT

## 2019-04-09 RX ORDER — ITRACONAZOLE 100 MG/1
100 CAPSULE ORAL 2 TIMES DAILY
Qty: 30 CAPSULE | Refills: 0 | Status: SHIPPED | OUTPATIENT
Start: 2019-04-09 | End: 2019-04-12

## 2019-04-09 RX ORDER — PANTOPRAZOLE SODIUM 40 MG/1
40 TABLET, DELAYED RELEASE ORAL
Qty: 30 TABLET | Refills: 3 | Status: SHIPPED | OUTPATIENT
Start: 2019-04-09

## 2019-04-09 RX ADMIN — CHOLECALCIFEROL TAB 50 MCG (2000 UNIT) 2000 UNITS: 50 TAB at 09:46

## 2019-04-09 RX ADMIN — PREDNISONE 10 MG: 5 TABLET ORAL at 09:46

## 2019-04-09 RX ADMIN — PANTOPRAZOLE SODIUM 40 MG: 40 TABLET, DELAYED RELEASE ORAL at 06:10

## 2019-04-09 RX ADMIN — ITRACONAZOLE 200 MG: 100 CAPSULE ORAL at 20:44

## 2019-04-09 RX ADMIN — MIRTAZAPINE 15 MG: 15 TABLET, FILM COATED ORAL at 20:45

## 2019-04-09 RX ADMIN — FLUTICASONE PROPIONATE 1 SPRAY: 50 SPRAY, METERED NASAL at 09:46

## 2019-04-09 RX ADMIN — METOPROLOL SUCCINATE 25 MG: 25 TABLET, EXTENDED RELEASE ORAL at 09:46

## 2019-04-09 RX ADMIN — LEVOTHYROXINE SODIUM 50 MCG: 50 TABLET ORAL at 09:46

## 2019-04-09 ASSESSMENT — PAIN SCALES - GENERAL
PAINLEVEL_OUTOF10: 0

## 2019-04-09 NOTE — PROGRESS NOTES
[address] [place] [address] [facility]  [facility]  Progress Note    SUBJECTIVE:  Chief Complaint   Patient presents with    GI Bleeding     sent from [facility] facility for dark red blood in stool     Patient feels terrible. Although the inner side of her right eye does not hurt, she feels that it is not getting smaller even though she has been told it is actually smaller. Denies dyspnea or chest pain. No hemoptysis. Tolerating antibiotics. Appetite is poor. Review of systems:  As stated above in the chief complaint, otherwise negative. Medications:  Scheduled Meds:   pantoprazole  40 mg Oral QAM AC    enoxaparin  40 mg Subcutaneous Daily    vitamin D  2,000 Units Oral Daily    fluticasone  1 spray Each Nare Daily    itraconazole  200 mg Oral BID    levothyroxine  50 mcg Oral Daily    metoprolol succinate  25 mg Oral Daily    mirtazapine  15 mg Oral Nightly    predniSONE  10 mg Oral QAM     Continuous Infusions:  PRN Meds:albuterol    OBJECTIVE:  /60   Pulse 92   Temp 98.4 °F (36.9 °C) (Oral)   Resp 18   Ht 5' 4\" (1.626 m)   Wt 134 lb 11.2 oz (61.1 kg)   SpO2 92%   BMI 23.12 kg/m²   Temp  Av °F (36.7 °C)  Min: 97.7 °F (36.5 °C)  Max: 98.4 °F (36.9 °C)  Constitutional: The patient is awake, alert, and oriented. Sitting in chair. No distress. Very hard of hearing. Skin: Warm and dry. No rashes were noted. HEENT: Soft subcutaneous irregularity over the right inner canthus. No jaundice. Moist mucous membranes, no ulcerations, no thrush. Neck: Supple to movements. Chest: No wheezing, crackles, or rhonchi. Cardiovascular: S1 and S2 are rhythmic and regular. No murmurs appreciated. Abdomen: Positive bowel sounds to auscultation. Benign to palpation. Extremities: No edema.   Lines: peripheral    Laboratory and Tests Review:  Lab Results   Component Value Date    WBC 14.9 (H) 2019    WBC 13.3 (H) 2019    WBC 12.7 (H) 2019    HGB 8.2 (L) 2019    HCT 28.3 (L) 04/09/2019    MCV 96.6 04/09/2019     04/09/2019     Lab Results   Component Value Date    NEUTROABS 10.16 (H) 04/04/2019    NEUTROABS 8.02 (H) 02/24/2019    NEUTROABS 11.00 (H) 02/23/2019     Lab Results   Component Value Date    CRP 10.0 (H) 04/09/2019    CRP 16.0 (H) 02/21/2019    CRP 5.2 (H) 02/22/2018     No results found for: CRPHS  Lab Results   Component Value Date    SEDRATE 115 (H) 04/09/2019    SEDRATE 110 (H) 02/25/2019    SEDRATE 105 (H) 02/24/2019     Lab Results   Component Value Date    ALT 10 04/08/2019    AST 15 04/08/2019    ALKPHOS 71 04/08/2019    BILITOT 0.4 04/08/2019     Lab Results   Component Value Date     04/08/2019    K 4.0 04/08/2019    K 4.5 02/23/2019     04/08/2019    CO2 35 04/08/2019    BUN 16 04/08/2019    CREATININE 0.5 04/08/2019    CREATININE 0.5 04/07/2019    CREATININE 0.4 04/06/2019    GFRAA >60 04/08/2019    LABGLOM >60 04/08/2019    GLUCOSE 100 04/08/2019    PROT 5.7 04/08/2019    LABALBU 2.3 04/08/2019    CALCIUM 8.3 04/08/2019    BILITOT 0.4 04/08/2019    ALKPHOS 71 04/08/2019    AST 15 04/08/2019    ALT 10 04/08/2019     Radiology:  CT of the chest reviewed. Right pleural effusion. The nodules and cavitary lesions have disappeared    Microbiology:   No new cultures  No results for input(s): BC in the last 72 hours. No results for input(s): ORG in the last 72 hours. No results for input(s): Patsie Schaumann in the last 72 hours. No results for input(s): STREPNEUMAGU in the last 72 hours. No results for input(s): LP1UAG in the last 72 hours. No results for input(s): ASO in the last 72 hours. No results for input(s): CULTRESP in the last 72 hours. No results for input(s): LABURIN in the last 72 hours. ASSESSMENT:  · Aspiration pneumonia. Completed a course of 3 weeks of Augmentin  · Cavitary pneumonia associated to the above. Possible fungal pneumonia. Status post fine-needle aspiration with pyogranulomatous inflammation.   · Resolving dacryocystitis  · Left pleural effusion    PLAN:  · Continue Itraconazole for another 6 weeks  · Ophthalmology input tiera Plascencia  12:56 PM  4/9/2019

## 2019-04-09 NOTE — CONSULTS
Inpatient consult to Ophthalmology  Consult performed by: Benny Olivier MD  Consult ordered by: Mamie Castaneda DO  Assessment/Recommendations: [address]  YOB: 1928    [id number]    Re:   [address]      The patient is a 80 y.o. female whose family asked for an evaluation of a lid problem. Mild Redness of the periorbital area noted in the medial canthal area left side. Patient states she has had for a few weeks. It is not painful. Patient is oriented to person, place, time, and general circumstances. Past Medical History:  No date: Arthritis  2/25/2015: Atherosclerosis of native arteries of extremity with rest   pain ([facility]  No date: CAD (coronary artery disease)  2/25/2015: Carotid bruit  No date: Hypertension  No date: Lymphoma ([facility]  No date:  Movement disorder  No date: Peripheral vascular disease ([facility]  3/25/2015: Post-operative state  11/5/2015: PVD (peripheral vascular disease) with claudication ([facility]  No date: Thyroid disease  Past Surgical History:  1950: APPENDECTOMY  No date: COLONOSCOPY  1998: DILATATION, ESOPHAGUS; N/A      Comment:  GALLBLADDER REMOVED   3-4-15: EMBOLECTOMY; Right      Comment:  popliteal, tibial - Dr. Cedeno Serum: HYSTERECTOMY  1999: JOINT REPLACEMENT; Right      Comment:  RT HIP   No Known Allergies  Review of Systems - reviewed with patient and reviewed in chart  Medications reviewed in chart  Review of Systems - reviewed with patient and reviewed in chart    Past Ocular history: unknown    Ophthalmic exam:  Pupils Equally round and reactive to light     Extraocular motility: extra-ocular motions intact   No restriction of movement or proptosis noted- denies diplopia  Right eye/Left eye confrontation  Normal difficult secondary to edema    RIGHT  Orbit - normal    lids/lashes/lacrimal system resolving dacrocystitis-  Area is soft without tenderness     conjunctiva/sclera normal   cornea normal  anterior chamber normal  iris normal  +RR  LEFT  Orbit - normal     Lids/lashes /lacrimal system normal  conjunctiva/sclera normal  cornea normal  anterior chamber normal  iris normal  +RR  Assessment:    Resolving dacrocystitis- appears to be resolving did have partial antibiotics - I have asked that nurses do warm compresses - and will monitor    Plan:  Antibiotics as per Infectious disease  Patient to notify staff to call me if patient notes decrease in vision, increase pain or reddness or change in peripheral vision. Follow up in my office or patient ophthalmologist if patient has- after discharge ( Within next 2 week) [phone].     Thank you   Gerardokamini Roach M.D.

## 2019-04-09 NOTE — PROGRESS NOTES
Subjective: The patient is awake and alert. No problems overnight. Denies chest pain, angina, and dyspnea. Denies abdominal pain. Tolerating diet. No nausea or vomiting. Objective:    BP (!) 106/56   Pulse 77   Temp 97.7 °F (36.5 °C) (Axillary)   Resp 17   Ht 5' 4\" (1.626 m)   Wt 134 lb 11.2 oz (61.1 kg)   SpO2 92%   BMI 23.12 kg/m²     Heart:  RRR, no murmurs, gallops, or rubs.   Lungs:  CTA bilaterally, no wheeze, rales or rhonchi  Abd: bowel sounds present, nontender, nondistended, no masses  Extrem:  No clubbing, cyanosis, or edema    CBC with Differential:    Lab Results   Component Value Date    WBC 13.3 04/08/2019    RBC 2.87 04/08/2019    HGB 8.0 04/08/2019    HCT 27.1 04/08/2019     04/08/2019    MCV 94.4 04/08/2019    MCH 27.9 04/08/2019    MCHC 29.5 04/08/2019    RDW 17.7 04/08/2019    LYMPHOPCT 11.2 04/04/2019    MONOPCT 4.6 04/04/2019    BASOPCT 0.2 04/04/2019    MONOSABS 0.57 04/04/2019    LYMPHSABS 1.38 04/04/2019    EOSABS 0.04 04/04/2019    BASOSABS 0.02 04/04/2019     CMP:    Lab Results   Component Value Date     04/08/2019    K 4.0 04/08/2019    K 4.5 02/23/2019     04/08/2019    CO2 35 04/08/2019    BUN 16 04/08/2019    CREATININE 0.5 04/08/2019    GFRAA >60 04/08/2019    LABGLOM >60 04/08/2019    GLUCOSE 100 04/08/2019    PROT 5.7 04/08/2019    LABALBU 2.3 04/08/2019    CALCIUM 8.3 04/08/2019    BILITOT 0.4 04/08/2019    ALKPHOS 71 04/08/2019    AST 15 04/08/2019    ALT 10 04/08/2019     PT/INR:    Lab Results   Component Value Date    PROTIME 18.2 04/04/2019    INR 1.6 04/04/2019     @cktotal:3,ckmb:3,ckmbindex:3,troponini:3@  U/A:    Lab Results   Component Value Date    NITRU Negative 02/23/2019    COLORU Yellow 02/23/2019    PHUR 6.0 02/23/2019    LABCAST RARE 01/11/2017    WBCUA 0-1 02/23/2019    RBCUA 0-1 02/23/2019    MUCUS Present 02/23/2019    BACTERIA MANY 02/23/2019    CLARITYU SL CLOUDY 02/23/2019    SPECGRAV <=1.005 02/23/2019    UROBILINOGEN 2.0 02/23/2019    BILIRUBINUR Negative 02/23/2019    BLOODU MODERATE 02/23/2019    GLUCOSEU Negative 02/23/2019    KETUA Negative 02/23/2019    AMORPHOUS MODERATE 02/23/2019        Assessment:    Patient Active Problem List   Diagnosis    Septic arthritis of wrist ([address]    Hyponatremia    Essential hypertension, benign    Chondrocalcinosis    Leukocytosis    Carotid bruit    PVD (peripheral vascular disease) with claudication (HCC)    Inflammatory polyarthritis (HCC)    Lung mass    Aspiration pneumonia (HCC)    GI bleed    Lower GI bleed    Mild protein-calorie malnutrition (HCC)       Plan:    Cavitary pneumonia-  No lung symptoms  With pleural effusion would only tap if Id thinks necessary  [id number] [address] for ECF when this issue resolved    GI bleed-  Home anticoagulation for 2 weeks  Will need to transition to coumadin after GIblled  Can not take sporanox and NOAC together    Eye lesion-  Would like this evaluated prior to discharge      Yessica Peterson DO  5:34 AM  4/9/2019

## 2019-04-09 NOTE — PROGRESS NOTES
Occupational Therapy      Occupational Therapy referral received. Attempt this pm - upon entry patient soundly sleeping in bed.   Attempts to wake patient - patient briefly opened her eyes and went back to sleep  Will try back another date/time

## 2019-04-10 ENCOUNTER — APPOINTMENT (OUTPATIENT)
Dept: ULTRASOUND IMAGING | Age: 84
DRG: 377 | End: 2019-04-10
Payer: MEDICARE

## 2019-04-10 ENCOUNTER — APPOINTMENT (OUTPATIENT)
Dept: GENERAL RADIOLOGY | Age: 84
DRG: 377 | End: 2019-04-10
Payer: MEDICARE

## 2019-04-10 LAB
ALBUMIN SERPL-MCNC: 2.2 G/DL (ref 3.5–5.2)
ALP BLD-CCNC: 78 U/L (ref 35–104)
ALT SERPL-CCNC: 12 U/L (ref 0–32)
ANION GAP SERPL CALCULATED.3IONS-SCNC: 10 MMOL/L (ref 7–16)
APPEARANCE FLUID: NORMAL
AST SERPL-CCNC: 13 U/L (ref 0–31)
B.E.: 8.6 MMOL/L (ref -3–3)
BILIRUB SERPL-MCNC: 0.4 MG/DL (ref 0–1.2)
BUN BLDV-MCNC: 20 MG/DL (ref 8–23)
CALCIUM SERPL-MCNC: 8.1 MG/DL (ref 8.6–10.2)
CELL COUNT FLUID TYPE: NORMAL
CHLORIDE BLD-SCNC: 102 MMOL/L (ref 98–107)
CO2: 31 MMOL/L (ref 22–29)
COHB: 1.2 % (ref 0–1.5)
COLOR FLUID: NORMAL
CREAT SERPL-MCNC: 0.5 MG/DL (ref 0.5–1)
CRITICAL: ABNORMAL
DATE ANALYZED: ABNORMAL
DATE OF COLLECTION: ABNORMAL
FLUID TYPE: NORMAL
GFR AFRICAN AMERICAN: >60
GFR NON-AFRICAN AMERICAN: >60 ML/MIN/1.73
GLUCOSE BLD-MCNC: 115 MG/DL (ref 74–99)
GLUCOSE, FLUID: 201 MG/DL
HCO3: 32.4 MMOL/L (ref 22–26)
HCT VFR BLD CALC: 25.5 % (ref 34–48)
HEMOGLOBIN: 7.8 G/DL (ref 11.5–15.5)
HHB: 1.9 % (ref 0–5)
LAB: ABNORMAL
LD, FLUID: 63 U/L
Lab: ABNORMAL
MCH RBC QN AUTO: 29 PG (ref 26–35)
MCHC RBC AUTO-ENTMCNC: 30.6 % (ref 32–34.5)
MCV RBC AUTO: 94.8 FL (ref 80–99.9)
METHB: 0.2 % (ref 0–1.5)
MODE: ABNORMAL
MONOCYTE, FLUID: 79 %
NEUTROPHIL, FLUID: 21 %
NUCLEATED CELLS FLUID: 396 /UL
O2 CONTENT: 11.6 ML/DL
O2 SATURATION: 98.1 % (ref 92–98.5)
O2HB: 96.7 % (ref 94–97)
OPERATOR ID: 7874
PATIENT TEMP: 37 C
PCO2: 41.6 MMHG (ref 35–45)
PDW BLD-RTO: 18.9 FL (ref 11.5–15)
PH BLOOD GAS: 7.51 (ref 7.35–7.45)
PLATELET # BLD: 433 E9/L (ref 130–450)
PMV BLD AUTO: 9.5 FL (ref 7–12)
PO2: 105.2 MMHG (ref 60–100)
POTASSIUM SERPL-SCNC: 3.4 MMOL/L (ref 3.5–5)
PROTEIN FLUID: 1.5 G/DL
RBC # BLD: 2.69 E12/L (ref 3.5–5.5)
RBC FLUID: NORMAL /UL
SODIUM BLD-SCNC: 143 MMOL/L (ref 132–146)
SOURCE, BLOOD GAS: ABNORMAL
THB: 8.4 G/DL (ref 11.5–16.5)
TIME ANALYZED: 1625
TOTAL PROTEIN: 5.5 G/DL (ref 6.4–8.3)
WBC # BLD: 15.1 E9/L (ref 4.5–11.5)

## 2019-04-10 PROCEDURE — 0W9B3ZZ DRAINAGE OF LEFT PLEURAL CAVITY, PERCUTANEOUS APPROACH: ICD-10-PCS | Performed by: INTERNAL MEDICINE

## 2019-04-10 PROCEDURE — 36415 COLL VENOUS BLD VENIPUNCTURE: CPT

## 2019-04-10 PROCEDURE — 82947 ASSAY GLUCOSE BLOOD QUANT: CPT

## 2019-04-10 PROCEDURE — 87206 SMEAR FLUORESCENT/ACID STAI: CPT

## 2019-04-10 PROCEDURE — 6370000000 HC RX 637 (ALT 250 FOR IP): Performed by: INTERNAL MEDICINE

## 2019-04-10 PROCEDURE — 87070 CULTURE OTHR SPECIMN AEROBIC: CPT

## 2019-04-10 PROCEDURE — 71045 X-RAY EXAM CHEST 1 VIEW: CPT

## 2019-04-10 PROCEDURE — 36600 WITHDRAWAL OF ARTERIAL BLOOD: CPT

## 2019-04-10 PROCEDURE — 87205 SMEAR GRAM STAIN: CPT

## 2019-04-10 PROCEDURE — 32555 ASPIRATE PLEURA W/ IMAGING: CPT

## 2019-04-10 PROCEDURE — 84157 ASSAY OF PROTEIN OTHER: CPT

## 2019-04-10 PROCEDURE — 6370000000 HC RX 637 (ALT 250 FOR IP): Performed by: SPECIALIST

## 2019-04-10 PROCEDURE — 85027 COMPLETE CBC AUTOMATED: CPT

## 2019-04-10 PROCEDURE — 82805 BLOOD GASES W/O2 SATURATION: CPT

## 2019-04-10 PROCEDURE — 89051 BODY FLUID CELL COUNT: CPT

## 2019-04-10 PROCEDURE — 97165 OT EVAL LOW COMPLEX 30 MIN: CPT

## 2019-04-10 PROCEDURE — 87116 MYCOBACTERIA CULTURE: CPT

## 2019-04-10 PROCEDURE — 87015 SPECIMEN INFECT AGNT CONCNTJ: CPT

## 2019-04-10 PROCEDURE — 1200000000 HC SEMI PRIVATE

## 2019-04-10 PROCEDURE — 83615 LACTATE (LD) (LDH) ENZYME: CPT

## 2019-04-10 PROCEDURE — 80053 COMPREHEN METABOLIC PANEL: CPT

## 2019-04-10 PROCEDURE — 87102 FUNGUS ISOLATION CULTURE: CPT

## 2019-04-10 PROCEDURE — 6360000002 HC RX W HCPCS: Performed by: INTERNAL MEDICINE

## 2019-04-10 RX ORDER — POTASSIUM CHLORIDE 20 MEQ/1
40 TABLET, EXTENDED RELEASE ORAL ONCE
Status: COMPLETED | OUTPATIENT
Start: 2019-04-10 | End: 2019-04-10

## 2019-04-10 RX ADMIN — CHOLECALCIFEROL TAB 50 MCG (2000 UNIT) 2000 UNITS: 50 TAB at 08:23

## 2019-04-10 RX ADMIN — CIPROFLOXACIN HYDROCHLORIDE 0.5 INCH: 3 OINTMENT OPHTHALMIC at 21:46

## 2019-04-10 RX ADMIN — PANTOPRAZOLE SODIUM 40 MG: 40 TABLET, DELAYED RELEASE ORAL at 05:55

## 2019-04-10 RX ADMIN — ENOXAPARIN SODIUM 40 MG: 40 INJECTION SUBCUTANEOUS at 08:23

## 2019-04-10 RX ADMIN — FLUTICASONE PROPIONATE 1 SPRAY: 50 SPRAY, METERED NASAL at 08:23

## 2019-04-10 RX ADMIN — LEVOTHYROXINE SODIUM 50 MCG: 50 TABLET ORAL at 08:23

## 2019-04-10 RX ADMIN — ITRACONAZOLE 200 MG: 100 CAPSULE ORAL at 10:53

## 2019-04-10 RX ADMIN — MIRTAZAPINE 15 MG: 15 TABLET, FILM COATED ORAL at 21:46

## 2019-04-10 RX ADMIN — METOPROLOL SUCCINATE 25 MG: 25 TABLET, EXTENDED RELEASE ORAL at 08:23

## 2019-04-10 RX ADMIN — ITRACONAZOLE 200 MG: 100 CAPSULE ORAL at 21:00

## 2019-04-10 RX ADMIN — POTASSIUM CHLORIDE 40 MEQ: 20 TABLET, EXTENDED RELEASE ORAL at 14:19

## 2019-04-10 RX ADMIN — PREDNISONE 10 MG: 5 TABLET ORAL at 08:23

## 2019-04-10 RX ADMIN — CIPROFLOXACIN HYDROCHLORIDE 0.5 INCH: 3 OINTMENT OPHTHALMIC at 16:41

## 2019-04-10 ASSESSMENT — PAIN SCALES - GENERAL
PAINLEVEL_OUTOF10: 0
PAINLEVEL_OUTOF10: 0

## 2019-04-10 NOTE — PROGRESS NOTES
increase safety and independence with completion of ADL/IADL tasks for functional independence and quality of life. ALARM ON     Eval Complexity: Low    Assessment of current deficits   Functional mobility [x]  ADLs [x] Strength [x]  Cognition []  Functional transfers  [x] IADLs [x] Safety Awareness [x]  Endurance [x]  Fine Motor Coordination [] Balance [x] Vision/perception [] Sensation []   Gross Motor Coordination [] ROM [] Delirium []                  Motor Control []    Plan of Care:   ADL retraining [x]   Equipment needs [x]   Neuromuscular re-education [x] Energy Conservation Techniques [x]  Functional Transfer training [x] Patient and/or Family Education [x]  Functional Mobility training [x]  Environmental Modifications [x]  Cognitive re-training []   Compensatory techniques for ADLs [x]  Splinting Needs []   Positioning to improve overall function [x]   Therapeutic Activity [x]  Therapeutic Exercise  [x]  Visual/Perceptual: []    Delirium prevention/treatment  []   Other:  []    Rehab Potential: Good for established goals     Patient / Family Goal: return home       Patient and/or family were instructed on functional diagnosis, prognosis/goals and OT plan of care. Demonstrated good  understanding.     Low Evaluation     Evaluation time includes thorough review of current medical information, gathering information on past medical history/social history and prior level of function, completion of standardized testing/informal observation of tasks, assessment of data, and development of POC/Goals      Bert Cam OTR/L [id number]

## 2019-04-10 NOTE — PATIENT CARE CONFERENCE
[facility] Quality Flow/Interdisciplinary Rounds Progress Note        Quality Flow Rounds held on April 10, 2019    Disciplines Attending:  Bedside Nurse, ,  and Nursing Unit Leadership    Quin Redd was admitted on 4/4/2019  8:15 AM    Anticipated Discharge Date:  Expected Discharge Date: 04/07/19    Disposition:    Yosi Score:  Yosi Scale Score: 18    Readmission Risk              Risk of Unplanned Readmission:        22           Discussed patient goal for the day, patient clinical progression, and barriers to discharge.   The following Goal(s) of the Day/Commitment(s) have been identified:  Labs - Report Results      Adeel Henderson  April 10, 2019

## 2019-04-10 NOTE — PROGRESS NOTES
Admit Date: 4/4/2019    Subjective:     Patient seen. Discussed with Dr. Giuseppe Gooden and nursing. SHe has a bed A@ ECF but a bit more dyspneic today. Recent CT chest suggests signficant pleural effusion. Before d/c would ask pulmonary to see and consider at least a therapeutic t-centesis. Scheduled Meds:   pantoprazole  40 mg Oral QAM AC    enoxaparin  40 mg Subcutaneous Daily    vitamin D  2,000 Units Oral Daily    fluticasone  1 spray Each Nare Daily    itraconazole  200 mg Oral BID    levothyroxine  50 mcg Oral Daily    metoprolol succinate  25 mg Oral Daily    mirtazapine  15 mg Oral Nightly    predniSONE  10 mg Oral QAM     Continuous Infusions:  PRN Meds:albuterol      Objective:     I/O last 3 completed shifts: In: 5 [P.O.:420]  Out: -   I/O this shift:  In: 120 [P.O.:120]  Out: -   BP (!) 102/58   Pulse 91   Temp 98 °F (36.7 °C) (Oral)   Resp 16   Ht 5' 4\" (1.626 m)   Wt 134 lb (60.8 kg)   SpO2 94%   BMI 23.00 kg/m²     General appearance: alert, appears stated age and cooperative  Neck: no adenopathy, no carotid bruit, no JVD, supple, symmetrical, trachea midline and thyroid not enlarged, symmetric, no tenderness/mass/nodules  Lungs: Dimished BS bilaterally.   Chest wall: no tenderness  Heart: regular rate and rhythm, S1, S2 normal, no murmur, click, rub or gallop  Abdomen: soft, non-tender; bowel sounds normal; no masses,  no organomegaly  Extremities: 1+ edema, no calf tenderness    Data Review    CBC with Differential:    Lab Results   Component Value Date    WBC 15.1 04/10/2019    RBC 2.69 04/10/2019    HGB 7.8 04/10/2019    HCT 25.5 04/10/2019     04/10/2019    MCV 94.8 04/10/2019    MCH 29.0 04/10/2019    MCHC 30.6 04/10/2019    RDW 18.9 04/10/2019    LYMPHOPCT 11.2 04/04/2019    MONOPCT 4.6 04/04/2019    BASOPCT 0.2 04/04/2019    MONOSABS 0.57 04/04/2019    LYMPHSABS 1.38 04/04/2019    EOSABS 0.04 04/04/2019    BASOSABS 0.02 04/04/2019     CMP:    Lab Results   Component

## 2019-04-10 NOTE — PROCEDURES
Thoracentesis  Serena Otto 80 y.o. female [id number] 4/10/2019    Indication: Pleural Effusion    Performed by:Violeta Michelle    Consent: Verbal consent obtained. Written consent obtained. Risks and benefits: risks, benefits and alternatives were discussed including bleeding and pneumothorax  Consent given by: the patient and the patient's  and daughter  Time out: Immediately prior to procedure a \"time out\" was called to verify the correct  Patient was made up in sitting position and ultrasound was done and site of maximum fluid collection was marked on left  Preparation: Patient was prepped and draped in the usual sterile fashion. Local anesthesia used: yes  Local anesthetic: lidocaine 2% without epinephrine  Once there was free fluid return needle was changed with Arrow's thoracentesis tray's soft catheter and close to 450cc of bloody fluid drained from the on left pleural space. Patient tolerated the procedure well  Complications: None apparent  Chest Xray post procedure ordered to rule out pneumothorax. Following Labs Sent;    LDH, Glucose, total Protein,   Cell count and Diff  Cup #1: Gram Stain and Cultures and Sensitivity, AFB smear and Cultures, and KOH stain and Fungal cultures. Cup #2 : Cytology    Please note that there was very limited window due to atelectatic lung close to surface of collection. Procedure was done in entirety with daughter present and was very well tolerated with no immediate complications. Oralia Smith MD, F.C.C.P.

## 2019-04-10 NOTE — CONSULTS
Patient  Ofelia Fragoso   Date of admission 4/4/19    CC: shortness of breath, hard of hearing    HPI: Ofelia Fragoso is a 80y.o. 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 On 2/24 she had + (1,3) Beta D Glucan and she has been on itraconaole. She has history of polyarticular inflammatory arthritis, hypertension and hypothyroidism and she is on chronic prednisone. We are asked to see her for therapeutic thoracentesis. She is extremely hard of hearing. She does not maintain attention, and is only able to tell me that she is short of breath.      Current Facility-Administered Medications   Medication Dose Route Frequency Provider Last Rate Last Dose    ciprofloxacin HCl (CILOXAN) 0.3 % ophthalmic ointment 0.5 inch  0.5 inch Right Eye TID Zeeshan Vasquez MD        pantoprazole (PROTONIX) tablet 40 mg  40 mg Oral QAM AC [facility], DO   40 mg at 04/10/19 0555    enoxaparin (LOVENOX) injection 40 mg  40 mg Subcutaneous Daily [facility], DO   40 mg at 04/10/19 5535    albuterol (PROVENTIL) nebulizer solution 2.5 mg  2.5 mg Nebulization Q6H PRN Cindy Artis MD        vitamin D tablet 2,000 Units  2,000 Units Oral Daily Cindy Artis MD   2,000 Units at 04/10/19 0823    fluticasone (FLONASE) 50 MCG/ACT nasal spray 1 spray  1 spray Each Nare Daily Cindy Artis MD   1 spray at 04/10/19 1862    itraconazole (SPORANOX) capsule 200 mg  200 mg Oral BID Cindy Artis MD   200 mg at 04/10/19 1053    levothyroxine (SYNTHROID) tablet 50 mcg  50 mcg Oral Daily Cindy Artis MD   50 mcg at 04/10/19 4202    metoprolol succinate (TOPROL XL) extended release tablet 25 mg  25 mg Oral Daily Cindy Artis MD   25 mg at 04/10/19 0823    mirtazapine (REMERON) tablet 15 mg  15 mg Oral Nightly Cindy Artis MD   15 mg at 04/09/19 2045    predniSONE (DELTASONE) tablet 10 mg  10 mg Oral TONY Berry MD   10 mg at 04/10/19 2254        has a past medical history of Arthritis, 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 has a past surgical history that includes Dilatation, esophagus (N/A, 1998); Hysterectomy (1995); Colonoscopy; Appendectomy (1950); joint replacement (Right, 1999); and embolectomy (Right, 3-4-15). reports that she quit smoking about 48 years ago. She smoked 0.25 packs per day. She has never used smokeless tobacco. She reports that she drinks alcohol. She reports that she does not use drugs. family history includes Arthritis in her mother; Cancer in her maternal cousin and sister; High Blood Pressure in her brother and mother; Stroke in her maternal aunt, maternal grandmother, and mother. Allergies Patient has no known allergies. ROS:  Unable to obtain from patient. Vitals:    04/10/19 1425   BP: (!) 108/53   Pulse: 86   Resp:    Temp: 97.6 °F (36.4 °C)   SpO2: 98%       Intake/Output Summary (Last 24 hours) at 4/10/2019 1523  Last data filed at 4/10/2019 1001  Gross per 24 hour   Intake 360 ml   Output --   Net 360 ml       Exam/Objective:   WDWN female with minimally labored respirations  Skin: warm, dry, without rash. + scattered ecchymoses  HEENT: PERRL, face symmetric, normal moist oral mucosa. No neck mass, adenopathy, or thyromegaly  Chest: symmetric with decreased air entry leftt > right  Lungs: very decreased breath sounds left base  Cardiac: irregularly irregular S1, s2  Abdomen: rounded, soft, non-tender, active bowel sounds. No mass or hepatosplenomegaly  Extremities: No clubbing, cyanosis, or edema  Neuro: Sleepy, hard of hearing.   Arouses briefly and goes right back to sleep    CBC with Differential:    Lab Results   Component Value Date    WBC 15.1 04/10/2019    RBC 2.69 04/10/2019    HGB 7.8 04/10/2019    HCT 25.5 04/10/2019     04/10/2019    MCV 94.8 04/10/2019    MCH 29.0 04/10/2019    MCHC 30.6 04/10/2019    RDW 18.9 04/10/2019    LYMPHOPCT 11.2 04/04/2019    MONOPCT 4.6 04/04/2019    BASOPCT 0.2 04/04/2019    MONOSABS 0.57 04/04/2019    LYMPHSABS 1.38 04/04/2019    EOSABS 0.04 04/04/2019    BASOSABS 0.02 04/04/2019     CMP:    Lab Results   Component Value Date     04/10/2019    K 3.4 04/10/2019    K 4.5 02/23/2019     04/10/2019    CO2 31 04/10/2019    BUN 20 04/10/2019    CREATININE 0.5 04/10/2019    GFRAA >60 04/10/2019    LABGLOM >60 04/10/2019    GLUCOSE 115 04/10/2019    PROT 5.5 04/10/2019    LABALBU 2.2 04/10/2019    CALCIUM 8.1 04/10/2019    BILITOT 0.4 04/10/2019    ALKPHOS 78 04/10/2019    AST 13 04/10/2019    ALT 12 04/10/2019       4/4/19         2/23/19    IMPRESSION:   1. Worsening pleural effusion   2. Possible fungal pneumonia  3. Aspiration pneumoina    Plan:   1. Check ABG  2. Recommend diagnostic/therapeutic thoracentesis - I have attempted to contact viviana Lundberg - phone number sounds like it is going to fax rather than voice mail. Patient is unable to give consent. Will ask staff to call when patient's family is present to discuss procedure.      Rao Mendosa MD, FACP, FCCP, FAASM

## 2019-04-10 NOTE — PROGRESS NOTES
I spoke to daughter Carole Bustillo in detail regarding thoracentesis. I have explained benefits and risks not limited to bleeding, infection and lung collapse and she is agreeable to proceeding.   Taina Christine MD

## 2019-04-10 NOTE — PROGRESS NOTES
[address] [place] [address] [facility]  JAKOB  Progress Note    SUBJECTIVE:  Chief Complaint   Patient presents with    GI Bleeding     sent from [facility] facility for dark red blood in stool     Patient has no new complaints today. Nursing noted that she was having some difficulty breathing this morning. Nursing also concerned that the right eye seems to be somewhat more swollen compared to yesterday. Review of systems:  As stated above in the chief complaint, otherwise negative. Medications:  Scheduled Meds:   potassium chloride  40 mEq Oral Once    pantoprazole  40 mg Oral QAM AC    enoxaparin  40 mg Subcutaneous Daily    vitamin D  2,000 Units Oral Daily    fluticasone  1 spray Each Nare Daily    itraconazole  200 mg Oral BID    levothyroxine  50 mcg Oral Daily    metoprolol succinate  25 mg Oral Daily    mirtazapine  15 mg Oral Nightly    predniSONE  10 mg Oral QAM     Continuous Infusions:  PRN Meds:albuterol    OBJECTIVE:  BP (!) 102/58   Pulse 91   Temp 98 °F (36.7 °C) (Oral)   Resp 16   Ht 5' 4\" (1.626 m)   Wt 134 lb (60.8 kg)   SpO2 94%   BMI 23.00 kg/m²   Temp  Av.6 °F (37 °C)  Min: 97.7 °F (36.5 °C)  Max: 100.1 °F (37.8 °C)  Constitutional: The patient is lying in bed. Asleep but arousable. Hard of hearing. Skin: Warm and dry. No rashes were noted. HEENT: Soft subcutaneous irregularity over the right inner canthus. To try to squeeze some fluid from the lacrimal duct on successfully. No jaundice. No thrush. Neck: Supple to movements. Chest: Good breath sounds. No crackles. Diminished breath sounds on bases. Cardiovascular: S1 and S2 are rhythmic and regular. No murmurs appreciated. Abdomen: Positive bowel sounds to auscultation. Benign to palpation. Extremities: No edema.   Lines: peripheral    Laboratory and Tests Review:  Lab Results   Component Value Date    WBC 15.1 (H) 04/10/2019    WBC 14.9 (H) 2019    WBC 13.3 (H) 2019    HGB 7.8 (L) 04/10/2019    HCT 25.5 (L) 04/10/2019    MCV 94.8 04/10/2019     04/10/2019     Lab Results   Component Value Date    NEUTROABS 10.16 (H) 04/04/2019    NEUTROABS 8.02 (H) 02/24/2019    NEUTROABS 11.00 (H) 02/23/2019     Lab Results   Component Value Date    CRP 10.0 (H) 04/09/2019    CRP 16.0 (H) 02/21/2019    CRP 5.2 (H) 02/22/2018     No results found for: CRPHS  Lab Results   Component Value Date    SEDRATE 115 (H) 04/09/2019    SEDRATE 110 (H) 02/25/2019    SEDRATE 105 (H) 02/24/2019     Lab Results   Component Value Date    ALT 12 04/10/2019    AST 13 04/10/2019    ALKPHOS 78 04/10/2019    BILITOT 0.4 04/10/2019     Lab Results   Component Value Date     04/10/2019    K 3.4 04/10/2019    K 4.5 02/23/2019     04/10/2019    CO2 31 04/10/2019    BUN 20 04/10/2019    CREATININE 0.5 04/10/2019    CREATININE 0.5 04/08/2019    CREATININE 0.5 04/07/2019    GFRAA >60 04/10/2019    LABGLOM >60 04/10/2019    GLUCOSE 115 04/10/2019    PROT 5.5 04/10/2019    LABALBU 2.2 04/10/2019    CALCIUM 8.1 04/10/2019    BILITOT 0.4 04/10/2019    ALKPHOS 78 04/10/2019    AST 13 04/10/2019    ALT 12 04/10/2019     Radiology:  CT of the chest reviewed. Right pleural effusion. The nodules and cavitary lesions have disappeared    Microbiology:   No new cultures  No results for input(s): BC in the last 72 hours. No results for input(s): ORG in the last 72 hours. No results for input(s): Ely Rung in the last 72 hours. No results for input(s): STREPNEUMAGU in the last 72 hours. No results for input(s): LP1UAG in the last 72 hours. No results for input(s): ASO in the last 72 hours. No results for input(s): CULTRESP in the last 72 hours. No results for input(s): LABURIN in the last 72 hours. ASSESSMENT:  · Aspiration pneumonia. Completed a course of 3 weeks of Augmentin  · Cavitary pneumonia associated to the above. Possible fungal pneumonia.  Status post fine-needle aspiration with pyogranulomatous inflammation. · Dacryocystitis, unchanged  · Left pleural effusion    PLAN:  · Continue Itraconazole for another 6 weeks  · Send culture of the right inner canthus  · Consult pulmonary. Consider thoracentesis    The case was discussed with Dr. Imelda Bloch.     Gamal Torres  1:28 PM  4/10/2019

## 2019-04-11 LAB
ALBUMIN SERPL-MCNC: 2.3 G/DL (ref 3.5–5.2)
ALP BLD-CCNC: 75 U/L (ref 35–104)
ALT SERPL-CCNC: 13 U/L (ref 0–32)
ANION GAP SERPL CALCULATED.3IONS-SCNC: 8 MMOL/L (ref 7–16)
AST SERPL-CCNC: 18 U/L (ref 0–31)
BILIRUB SERPL-MCNC: 0.3 MG/DL (ref 0–1.2)
BUN BLDV-MCNC: 19 MG/DL (ref 8–23)
CALCIUM SERPL-MCNC: 8.4 MG/DL (ref 8.6–10.2)
CHLORIDE BLD-SCNC: 98 MMOL/L (ref 98–107)
CO2: 33 MMOL/L (ref 22–29)
CREAT SERPL-MCNC: 0.5 MG/DL (ref 0.5–1)
GFR AFRICAN AMERICAN: >60
GFR NON-AFRICAN AMERICAN: >60 ML/MIN/1.73
GLUCOSE BLD-MCNC: 87 MG/DL (ref 74–99)
HCT VFR BLD CALC: 26.9 % (ref 34–48)
HEMOGLOBIN: 8 G/DL (ref 11.5–15.5)
MCH RBC QN AUTO: 29.1 PG (ref 26–35)
MCHC RBC AUTO-ENTMCNC: 29.7 % (ref 32–34.5)
MCV RBC AUTO: 97.8 FL (ref 80–99.9)
PDW BLD-RTO: 19.3 FL (ref 11.5–15)
PLATELET # BLD: 442 E9/L (ref 130–450)
PMV BLD AUTO: 9.9 FL (ref 7–12)
POTASSIUM SERPL-SCNC: 4.4 MMOL/L (ref 3.5–5)
RBC # BLD: 2.75 E12/L (ref 3.5–5.5)
SODIUM BLD-SCNC: 139 MMOL/L (ref 132–146)
TOTAL PROTEIN: 5.7 G/DL (ref 6.4–8.3)
WBC # BLD: 13.5 E9/L (ref 4.5–11.5)

## 2019-04-11 PROCEDURE — 2700000000 HC OXYGEN THERAPY PER DAY

## 2019-04-11 PROCEDURE — 80053 COMPREHEN METABOLIC PANEL: CPT

## 2019-04-11 PROCEDURE — 6370000000 HC RX 637 (ALT 250 FOR IP): Performed by: INTERNAL MEDICINE

## 2019-04-11 PROCEDURE — 97530 THERAPEUTIC ACTIVITIES: CPT

## 2019-04-11 PROCEDURE — 6360000002 HC RX W HCPCS: Performed by: INTERNAL MEDICINE

## 2019-04-11 PROCEDURE — 85027 COMPLETE CBC AUTOMATED: CPT

## 2019-04-11 PROCEDURE — 36415 COLL VENOUS BLD VENIPUNCTURE: CPT

## 2019-04-11 PROCEDURE — 1200000000 HC SEMI PRIVATE

## 2019-04-11 RX ADMIN — CIPROFLOXACIN HYDROCHLORIDE 0.5 INCH: 3 OINTMENT OPHTHALMIC at 13:32

## 2019-04-11 RX ADMIN — CHOLECALCIFEROL TAB 50 MCG (2000 UNIT) 2000 UNITS: 50 TAB at 09:31

## 2019-04-11 RX ADMIN — MIRTAZAPINE 15 MG: 15 TABLET, FILM COATED ORAL at 19:50

## 2019-04-11 RX ADMIN — METOPROLOL SUCCINATE 25 MG: 25 TABLET, EXTENDED RELEASE ORAL at 09:31

## 2019-04-11 RX ADMIN — ITRACONAZOLE 200 MG: 100 CAPSULE ORAL at 20:30

## 2019-04-11 RX ADMIN — CIPROFLOXACIN HYDROCHLORIDE 0.5 INCH: 3 OINTMENT OPHTHALMIC at 20:30

## 2019-04-11 RX ADMIN — ITRACONAZOLE 200 MG: 100 CAPSULE ORAL at 09:30

## 2019-04-11 RX ADMIN — LEVOTHYROXINE SODIUM 50 MCG: 50 TABLET ORAL at 09:31

## 2019-04-11 RX ADMIN — CIPROFLOXACIN HYDROCHLORIDE 0.5 INCH: 3 OINTMENT OPHTHALMIC at 09:32

## 2019-04-11 RX ADMIN — PREDNISONE 10 MG: 5 TABLET ORAL at 09:31

## 2019-04-11 RX ADMIN — PANTOPRAZOLE SODIUM 40 MG: 40 TABLET, DELAYED RELEASE ORAL at 06:18

## 2019-04-11 RX ADMIN — FLUTICASONE PROPIONATE 1 SPRAY: 50 SPRAY, METERED NASAL at 09:32

## 2019-04-11 RX ADMIN — ENOXAPARIN SODIUM 40 MG: 40 INJECTION SUBCUTANEOUS at 09:30

## 2019-04-11 ASSESSMENT — PAIN SCALES - GENERAL
PAINLEVEL_OUTOF10: 0

## 2019-04-11 ASSESSMENT — PAIN - FUNCTIONAL ASSESSMENT: PAIN_FUNCTIONAL_ASSESSMENT: ACTIVITIES ARE NOT PREVENTED

## 2019-04-11 NOTE — PROGRESS NOTES
Net 240 ml     CBC with Differential:    Lab Results   Component Value Date    WBC 13.5 04/11/2019    RBC 2.75 04/11/2019    HGB 8.0 04/11/2019    HCT 26.9 04/11/2019     04/11/2019    MCV 97.8 04/11/2019    MCH 29.1 04/11/2019    MCHC 29.7 04/11/2019    RDW 19.3 04/11/2019    LYMPHOPCT 11.2 04/04/2019    MONOPCT 4.6 04/04/2019    BASOPCT 0.2 04/04/2019    MONOSABS 0.57 04/04/2019    LYMPHSABS 1.38 04/04/2019    EOSABS 0.04 04/04/2019    BASOSABS 0.02 04/04/2019     BMP:    Lab Results   Component Value Date     04/11/2019    K 4.4 04/11/2019    K 4.5 02/23/2019    CL 98 04/11/2019    CO2 33 04/11/2019    BUN 19 04/11/2019    LABALBU 2.3 04/11/2019    CREATININE 0.5 04/11/2019    CALCIUM 8.4 04/11/2019    GFRAA >60 04/11/2019    LABGLOM >60 04/11/2019    GLUCOSE 87 04/11/2019   pl ldh 63 protein 1.5  CXR viewed post tap no ptx, small residual left effusion    IMPRESSION:   Patient Active Problem List   Diagnosis    Septic arthritis of wrist (HCC)    Hyponatremia    Essential hypertension, benign    Chondrocalcinosis    Leukocytosis    Carotid bruit    PVD (peripheral vascular disease) with claudication (HCC)    Inflammatory polyarthritis (HCC)    Lung mass    Aspiration pneumonia (HCC)    GI bleed    Lower GI bleed    Mild protein-calorie malnutrition (HCC)   Pleural fluid is transudative, no plans for further w/u. Acute hypoxic respiratory failure on 2 liters, wean as able.     Zachary Mooney  4/11/2019  1:47 PM

## 2019-04-11 NOTE — PATIENT CARE CONFERENCE
P Quality Flow/Interdisciplinary Rounds Progress Note        Quality Flow Rounds held on April 11, 2019    Disciplines Attending:  Bedside Nurse, ,  and Nursing Unit Leadership    Gris Huang was admitted on 4/4/2019  8:15 AM    Anticipated Discharge Date:  Expected Discharge Date: 04/12/19    Disposition:    Yosi Score:  Yosi Scale Score: 17    Readmission Risk              Risk of Unplanned Readmission:        23           Discussed patient goal for the day, patient clinical progression, and barriers to discharge.   The following Goal(s) of the Day/Commitment(s) have been identified:  Labs - Report Results      Yuliana Saeed  April 11, 2019

## 2019-04-11 NOTE — PROGRESS NOTES
Nutrition Assessment    Type and Reason for Visit: Reassess    Nutrition Recommendations: Continue Current Diet and Continue Ensure High Calorie ONS BID. Nutrition Assessment: Pt remains at nutritional risk d/t continued poor intake since adm d/t poor appetite, lethargy and confusion. At further risk d/t altered GI function. Malnutrition Assessment:  · Malnutrition Status: Mild Malnutrition  · Context: Acute illness or injury  · Findings of the 6 clinical characteristics of malnutrition (Minimum of 2 out of 6 clinical characteristics is required to make the diagnosis of moderate or severe Protein Calorie Malnutrition based on AND/ASPEN Guidelines):  1. Energy Intake-Less than or equal to 50% of estimated energy requirement, Unable to assess    2. Weight Loss-No significant weight loss    3. Fat Loss-No significant subcutaneous fat loss    4. Muscle Loss-Mild muscle mass loss, Clavicles (pectoralis and deltoids)  5. Fluid Accumulation-Mild fluid accumulation, Extremities  6.  Strength-Not measured    Nutrition Risk Level: Moderate    Nutrient Needs:  · Estimated Daily Total Kcal: 1144-0769 (25-30 kcals/kg CBW)  · Estimated Daily Protein (g): 75-85(1.2-1.4 gm/kg CBW)  · Estimated Daily Total Fluid (ml/day): 3898-5898(1 ml/kcal)    Nutrition Diagnosis:   · Problem:  In context of acute illness or injury(Mild Malnutrition)  · Etiology: related to Insufficient energy/nutrient consumption     Signs and symptoms:  as evidenced by Intake 25-50%, Diet history of poor intake, Weight loss, Mild muscle loss, Localized or generalized fluid accumulation, GI abnormality    Objective Information:  · Nutrition-Focused Physical Findings: A&Ox3, Confused, [place], upper dentures, Tender/Distended Abd, +BS, +1 BLE edema, +I/O's  · Wound Type: None  · Current Nutrition Therapies:  · Oral Diet Orders: General   · Oral Diet intake: 26-50%  · Oral Nutrition Supplement (ONS) Orders: Standard High Calorie Oral Supplement(BID)  · ONS intake: 26-50%  · Anthropometric Measures:  · Ht: 5' 4\" (162.6 cm)   · Current Body Wt: 134 lb (60.8 kg)(act 4/11)  · Admission Body Wt: 145 lb (65.8 kg)  · Usual Body Wt: 136 lb (61.7 kg)(per EMR x 3 mos at PCP office-wt 145# on last admit with edema)  · % Weight Change:  wt fluctuations since adm noted, likely 2/2 fluid shifts, +I/O's w/ edema currently  · Ideal Body Wt: 120 lb (54.4 kg), % Ideal Body 110%  · BMI Classification: BMI 18.5 - 24.9 Normal Weight    Nutrition Interventions:   Continue current diet, Continue current ONS(Continue Current Diet and Continue Ensure High Calorie ONS BID.  )  Continued Inpatient Monitoring, Education not appropriate at this time    Nutrition Evaluation:   · Evaluation: Goals set   · Goals: PO intake >75% of meals/ONS    · Monitoring: Meal Intake, Supplement Intake, Diet Tolerance, Skin Integrity, I&O, Mental Status/Confusion, Weight, Pertinent Labs, Chewing/Swallowing, Monitor Hemodynamic Status, Monitor Bowel Function      Electronically signed by Mary Ramirez RD, LD on 4/11/19 at 3:55 PM    Contact Number: ext [phone]

## 2019-04-11 NOTE — PROGRESS NOTES
Admit Date: 4/4/2019    Subjective:     Patient very difficult to communicate with. I suggested she consider pen/paper. Remains concerned about her R eye, notes poor appetitie, otherwise, no complaints. He(    Scheduled Meds:   ciprofloxacin HCl  0.5 inch Right Eye TID    pantoprazole  40 mg Oral QAM AC    enoxaparin  40 mg Subcutaneous Daily    vitamin D  2,000 Units Oral Daily    fluticasone  1 spray Each Nare Daily    itraconazole  200 mg Oral BID    levothyroxine  50 mcg Oral Daily    metoprolol succinate  25 mg Oral Daily    mirtazapine  15 mg Oral Nightly    predniSONE  10 mg Oral QAM     Continuous Infusions:  PRN Meds:albuterol      Objective:     I/O last 3 completed shifts: In: 480 [P.O.:480]  Out: -   No intake/output data recorded. BP (!) 143/62   Pulse 85   Temp 98.5 °F (36.9 °C) (Axillary)   Resp 24   Ht 5' 4\" (1.626 m)   Wt 134 lb 12.8 oz (61.1 kg)   SpO2 94%   BMI 23.14 kg/m²     General appearance: alert, appears stated age and cooperative  Neck: no adenopathy, no carotid bruit, no JVD, supple, symmetrical, trachea midline and thyroid not enlarged, symmetric, no tenderness/mass/nodules  Lungs: Dimished BS bilaterally.   Chest wall: no tenderness  Heart: regular rate and rhythm, S1, S2 normal, no murmur, click, rub or gallop  Abdomen: soft, non-tender; bowel sounds normal; no masses,  no organomegaly  Extremities: 1+ edema, no calf tenderness      Data Review    CBC with Differential:    Lab Results   Component Value Date    WBC 13.5 04/11/2019    RBC 2.75 04/11/2019    HGB 8.0 04/11/2019    HCT 26.9 04/11/2019     04/11/2019    MCV 97.8 04/11/2019    MCH 29.1 04/11/2019    MCHC 29.7 04/11/2019    RDW 19.3 04/11/2019    LYMPHOPCT 11.2 04/04/2019    MONOPCT 4.6 04/04/2019    BASOPCT 0.2 04/04/2019    MONOSABS 0.57 04/04/2019    LYMPHSABS 1.38 04/04/2019    EOSABS 0.04 04/04/2019    BASOSABS 0.02 04/04/2019     CMP:    Lab Results   Component Value Date     04/11/2019

## 2019-04-11 NOTE — PROGRESS NOTES
Occupational Therapy  Date:4/11/2019  Patient Name: Gris Huang  Room: 6101/6466-Y     Occupational Therapy (OT) treatment attempted this afternoon; patient declined to participate in [address] [place] [address] activities. Continue OT POC. Rowan Marinelli OTR/L  License Number: [id number]

## 2019-04-11 NOTE — PROGRESS NOTES
[address] [place] [address] [facility]  JAKOB  Progress Note    SUBJECTIVE:  Chief Complaint   Patient presents with    GI Bleeding     sent from [facility] facility for dark red blood in stool     Patient has no new complaints today. Nursing noted that she was having some difficulty breathing this morning. Nursing also concerned that the right eye seems to be somewhat more swollen compared to yesterday. Review of systems:  As stated above in the chief complaint, otherwise negative. Medications:  Scheduled Meds:   ciprofloxacin HCl  0.5 inch Right Eye TID    pantoprazole  40 mg Oral QAM AC    enoxaparin  40 mg Subcutaneous Daily    vitamin D  2,000 Units Oral Daily    fluticasone  1 spray Each Nare Daily    itraconazole  200 mg Oral BID    levothyroxine  50 mcg Oral Daily    metoprolol succinate  25 mg Oral Daily    mirtazapine  15 mg Oral Nightly    predniSONE  10 mg Oral QAM     Continuous Infusions:  PRN Meds:albuterol    OBJECTIVE:  BP (!) 143/62   Pulse 85   Temp 98.5 °F (36.9 °C) (Axillary)   Resp 24   Ht 5' 4\" (1.626 m)   Wt 134 lb 12.8 oz (61.1 kg)   SpO2 93%   BMI 23.14 kg/m²   Temp  Av.1 °F (36.7 °C)  Min: 97.6 °F (36.4 °C)  Max: 98.5 °F (36.9 °C)  Constitutional: The patient is lying in bed. Asleep but arousable. Hard of hearing. Skin: Warm and dry. No rashes were noted. HEENT: Soft subcutaneous irregularity over the right inner canthus. To try to squeeze some fluid from the lacrimal duct on successfully. No jaundice. No thrush. Neck: Supple to movements. Chest: Good breath sounds. No crackles. Diminished breath sounds on bases. Cardiovascular: S1 and S2 are rhythmic and regular. No murmurs appreciated. Abdomen: Positive bowel sounds to auscultation. Benign to palpation. Extremities: No edema.   Lines: peripheral    Laboratory and Tests Review:  Lab Results   Component Value Date    WBC 13.5 (H) 2019    WBC 15.1 (H) 04/10/2019    WBC 14.9 (H) 2019

## 2019-04-11 NOTE — PROGRESS NOTES
Physical Therapy    Facility/Department: [facility] MED SURG  Treatment Note  NAME: Gunnar Henderson  : 1928  MRN: [id number]    Date of Service: 2019    Evaluating Therapist: Sheri Mallory. Marvin Thrasher, P.T. Room #: 0083/6989-V  DIAGNOSIS: GI bleed  PRECAUTIONS: Falls, bed/chair alarm, [place]    Social:  Pt lives alone in a 1 floor plan 3 steps and 1 rail to enter. Prior to admission pt walked with a cane PRN     Initial Evaluation  Date: 19 Treatment      Short Term/ Long Term   Goals   Was pt agreeable to Eval/treatment? yes yes    Does pt have pain? No c/o pain No c/o pain    Bed Mobility  Rolling: SBA  Supine to sit: MIN A  Sit to supine: NA  Scooting: MIN A Rolling: MIN A  Supine to sit: MIN A  Sit to supine: MOD A  Scooting: MIN A Independent   Transfers Sit to stand: MIN A  Stand to sit: MIN A  Stand pivot: MIN A with ww Sit to stand: MOD A  Stand to sit: MOD A  Stand pivot: MIN A with ww Independent   Ambulation    15 feet x 2 reps with ww with MIN A 15 feet x 2 reps with ww MIN A 150 feet with ww with supervision   Stair negotiation: ascended and descended NA, pt fatigued with amb NA, pt fatigued with amb 3 steps with 1 rail with SBA   AM-PAC Raw Score  16 14      BLE ROM is WFL. BLE strength is grossly 4/5. Balance: sitting EOB SBA and standing with ww MIN A    Patient education  Pt was educated on hand placement during transfers and making sure to back all the way up to surface she is going to sit on before she sits down. Patient response to education:   Pt verbalized understanding Pt demonstrated skill Pt requires further education in this area   yes yes yes     Additional Comments: Pt c/o feeling very weak and tired. She reported need to use BR and walked with ww to and from BR with MIN A. She transferred on/off commode with MOD A and required MIN A for balance while she leaned to her left while sitting on commode to perform self hygiene care.   She stood at sink with MIN A to wash

## 2019-04-11 NOTE — PLAN OF CARE
Problem: Falls - Risk of:  Goal: Will remain free from falls  Description  Will remain free from falls  Outcome: Met This Shift     Problem: Bleeding:  Goal: Will show no signs and symptoms of excessive bleeding  Description  Will show no signs and symptoms of excessive bleeding  Outcome: Met This Shift     Problem:  Bowel Function - Altered:  Goal: Bowel elimination is within specified parameters  Description  Bowel elimination is within specified parameters  Outcome: Met This Shift     Problem: Fluid Volume - Imbalance:  Goal: Will show no signs and symptoms of excessive bleeding  Description  Will show no signs and symptoms of excessive bleeding  Outcome: Met This Shift     Problem: Nausea/Vomiting:  Goal: Absence of nausea/vomiting  Description  Absence of nausea/vomiting  Outcome: Met This Shift

## 2019-04-12 VITALS
BODY MASS INDEX: 22.9 KG/M2 | SYSTOLIC BLOOD PRESSURE: 126 MMHG | RESPIRATION RATE: 18 BRPM | HEART RATE: 94 BPM | TEMPERATURE: 98.1 F | WEIGHT: 134.13 LBS | DIASTOLIC BLOOD PRESSURE: 71 MMHG | OXYGEN SATURATION: 96 % | HEIGHT: 64 IN

## 2019-04-12 LAB
ALBUMIN SERPL-MCNC: 2.4 G/DL (ref 3.5–5.2)
ALP BLD-CCNC: 76 U/L (ref 35–104)
ALT SERPL-CCNC: 15 U/L (ref 0–32)
ANION GAP SERPL CALCULATED.3IONS-SCNC: 8 MMOL/L (ref 7–16)
AST SERPL-CCNC: 14 U/L (ref 0–31)
BILIRUB SERPL-MCNC: 0.4 MG/DL (ref 0–1.2)
BODY FLUID CULTURE, STERILE: NORMAL
BUN BLDV-MCNC: 19 MG/DL (ref 8–23)
CALCIUM SERPL-MCNC: 8.4 MG/DL (ref 8.6–10.2)
CHLORIDE BLD-SCNC: 101 MMOL/L (ref 98–107)
CO2: 34 MMOL/L (ref 22–29)
CREAT SERPL-MCNC: 0.5 MG/DL (ref 0.5–1)
GFR AFRICAN AMERICAN: >60
GFR NON-AFRICAN AMERICAN: >60 ML/MIN/1.73
GLUCOSE BLD-MCNC: 109 MG/DL (ref 74–99)
GRAM STAIN RESULT: NORMAL
HCT VFR BLD CALC: 27.8 % (ref 34–48)
HEMOGLOBIN: 8.1 G/DL (ref 11.5–15.5)
MCH RBC QN AUTO: 28.6 PG (ref 26–35)
MCHC RBC AUTO-ENTMCNC: 29.1 % (ref 32–34.5)
MCV RBC AUTO: 98.2 FL (ref 80–99.9)
PDW BLD-RTO: 19 FL (ref 11.5–15)
PLATELET # BLD: 430 E9/L (ref 130–450)
PMV BLD AUTO: 9.4 FL (ref 7–12)
POTASSIUM SERPL-SCNC: 4.4 MMOL/L (ref 3.5–5)
RBC # BLD: 2.83 E12/L (ref 3.5–5.5)
SODIUM BLD-SCNC: 143 MMOL/L (ref 132–146)
TOTAL PROTEIN: 5.8 G/DL (ref 6.4–8.3)
WBC # BLD: 13 E9/L (ref 4.5–11.5)

## 2019-04-12 PROCEDURE — 80053 COMPREHEN METABOLIC PANEL: CPT

## 2019-04-12 PROCEDURE — 36415 COLL VENOUS BLD VENIPUNCTURE: CPT

## 2019-04-12 PROCEDURE — 6370000000 HC RX 637 (ALT 250 FOR IP): Performed by: INTERNAL MEDICINE

## 2019-04-12 PROCEDURE — 6360000002 HC RX W HCPCS: Performed by: INTERNAL MEDICINE

## 2019-04-12 PROCEDURE — 85027 COMPLETE CBC AUTOMATED: CPT

## 2019-04-12 RX ORDER — ITRACONAZOLE 100 MG/1
200 CAPSULE ORAL 2 TIMES DAILY
Qty: 84 CAPSULE | Refills: 0 | DISCHARGE
Start: 2019-04-12 | End: 2019-05-03

## 2019-04-12 RX ORDER — ITRACONAZOLE 100 MG/1
100 CAPSULE ORAL 2 TIMES DAILY
Qty: 168 CAPSULE | Refills: 2 | Status: SHIPPED | OUTPATIENT
Start: 2019-04-12 | End: 2019-04-12

## 2019-04-12 RX ORDER — ITRACONAZOLE 100 MG/1
200 CAPSULE ORAL 2 TIMES DAILY
Qty: 168 CAPSULE | Refills: 2 | Status: SHIPPED | OUTPATIENT
Start: 2019-04-12 | End: 2019-04-12

## 2019-04-12 RX ADMIN — ITRACONAZOLE 200 MG: 100 CAPSULE ORAL at 09:11

## 2019-04-12 RX ADMIN — ENOXAPARIN SODIUM 40 MG: 40 INJECTION SUBCUTANEOUS at 09:11

## 2019-04-12 RX ADMIN — PANTOPRAZOLE SODIUM 40 MG: 40 TABLET, DELAYED RELEASE ORAL at 06:00

## 2019-04-12 RX ADMIN — PREDNISONE 10 MG: 5 TABLET ORAL at 09:11

## 2019-04-12 RX ADMIN — CHOLECALCIFEROL TAB 50 MCG (2000 UNIT) 2000 UNITS: 50 TAB at 09:11

## 2019-04-12 RX ADMIN — CIPROFLOXACIN HYDROCHLORIDE 0.5 INCH: 3 OINTMENT OPHTHALMIC at 09:11

## 2019-04-12 RX ADMIN — FLUTICASONE PROPIONATE 1 SPRAY: 50 SPRAY, METERED NASAL at 09:11

## 2019-04-12 RX ADMIN — LEVOTHYROXINE SODIUM 50 MCG: 50 TABLET ORAL at 09:11

## 2019-04-12 RX ADMIN — METOPROLOL SUCCINATE 25 MG: 25 TABLET, EXTENDED RELEASE ORAL at 09:14

## 2019-04-12 ASSESSMENT — PAIN SCALES - GENERAL: PAINLEVEL_OUTOF10: 0

## 2019-04-12 NOTE — CARE COORDINATION
04/12/19 1021   IMM Letter   IMM Letter given to Patient/Family/Significant other/Guardian/POA/by: Jean Garcia RN   IMM Letter date given: 04/12/19   IMM Letter time given: 6767   In anticipation of discharge, copy of original Important message from Medicare reviewed with patient's daughter over phone and original copy sent via email to [email or web address]. Annmarie Cabrera MSN, RN  [facility] Case Management  [phone]

## 2019-04-12 NOTE — PROGRESS NOTES
[address] [place] [address] [facility]  Progress Note    SUBJECTIVE:  Chief Complaint   Patient presents with    GI Bleeding     sent from [facility] facility for dark red blood in stool     Patient remains afebrile. No respiratory distress. No nausea or vomiting. Review of systems:  As stated above in the chief complaint, otherwise negative. Medications:  Scheduled Meds:   ciprofloxacin HCl  0.5 inch Right Eye TID    pantoprazole  40 mg Oral QAM AC    enoxaparin  40 mg Subcutaneous Daily    vitamin D  2,000 Units Oral Daily    fluticasone  1 spray Each Nare Daily    itraconazole  200 mg Oral BID    levothyroxine  50 mcg Oral Daily    metoprolol succinate  25 mg Oral Daily    mirtazapine  15 mg Oral Nightly    predniSONE  10 mg Oral QAM     Continuous Infusions:  PRN Meds:albuterol    OBJECTIVE:  /71   Pulse 94   Temp 98.1 °F (36.7 °C) (Oral)   Resp 18   Ht 5' 4\" (1.626 m)   Wt 134 lb 2 oz (60.8 kg)   SpO2 96%   BMI 23.02 kg/m²   Temp  Av.1 °F (36.7 °C)  Min: 98.1 °F (36.7 °C)  Max: 98.2 °F (36.8 °C)  Constitutional: The patient is lying in bed. Asleep but arousable. Hard of hearing. Skin: Warm and dry. No rashes were noted. HEENT: Soft subcutaneous irregularity over the right inner canthus. No change. No drainage. No erythema. Neck: Supple to movements. Chest: Good breath sounds. No crackles. Diminished breath sounds on bases. Cardiovascular: S1 and S2 are rhythmic and regular. No murmurs appreciated. Abdomen: Positive bowel sounds to auscultation. Benign to palpation. Extremities: No edema.   Lines: peripheral    Laboratory and Tests Review:  Lab Results   Component Value Date    WBC 13.0 (H) 2019    WBC 13.5 (H) 2019    WBC 15.1 (H) 04/10/2019    HGB 8.1 (L) 2019    HCT 27.8 (L) 2019    MCV 98.2 2019     2019     Lab Results   Component Value Date    NEUTROABS 10.16 (H) 2019    NEUTROABS 8.02 (H) 2019 NEUTROABS 11.00 (H) 02/23/2019     Lab Results   Component Value Date    CRP 10.0 (H) 04/09/2019    CRP 16.0 (H) 02/21/2019    CRP 5.2 (H) 02/22/2018     No results found for: CRPHS  Lab Results   Component Value Date    SEDRATE 115 (H) 04/09/2019    SEDRATE 110 (H) 02/25/2019    SEDRATE 105 (H) 02/24/2019     Lab Results   Component Value Date    ALT 15 04/12/2019    AST 14 04/12/2019    ALKPHOS 76 04/12/2019    BILITOT 0.4 04/12/2019     Lab Results   Component Value Date     04/12/2019    K 4.4 04/12/2019    K 4.5 02/23/2019     04/12/2019    CO2 34 04/12/2019    BUN 19 04/12/2019    CREATININE 0.5 04/12/2019    CREATININE 0.5 04/11/2019    CREATININE 0.5 04/10/2019    GFRAA >60 04/12/2019    LABGLOM >60 04/12/2019    GLUCOSE 109 04/12/2019    PROT 5.8 04/12/2019    LABALBU 2.4 04/12/2019    CALCIUM 8.4 04/12/2019    BILITOT 0.4 04/12/2019    ALKPHOS 76 04/12/2019    AST 14 04/12/2019    ALT 15 04/12/2019     Radiology:  CT of the chest reviewed. Right pleural effusion. The nodules and cavitary lesions have disappeared    Microbiology:   Right eye culture Corynebacterium species  Pleural fluid culture 4/10/19 negative so far    ASSESSMENT:  · Aspiration pneumonia. Completed a course of 3 weeks of Augmentin before she came to the hospital  · Cavitary pneumonia associated to the above. Possible fungal pneumonia. Status post fine-needle aspiration with pyogranulomatous inflammation. · Dacryocystitis, unchanged. Now on ciprofloxacin ointment  · Left pleural effusion. Status post thoracentesis 4/10/19. 450 mL of bloody fluid drained    PLAN:  · Continue Itraconazole for another 6 weeks.  Reconciled  · Patient can be discharged from ID standpoint    Yani Truong  11:37 AM  4/12/2019

## 2019-04-12 NOTE — PROGRESS NOTES
Verified with Dr. Berny Mullins- patient is to discharge on Lovenox  Electronically signed by Vianney Shi RN on 4/12/2019 at 11:18 AM

## 2019-04-12 NOTE — PROGRESS NOTES
Occupational Therapy  Patient treatment attempted this AM.  Patient declined tx requesting to rest prior to D/C today.                                                  Eddie JOSHUA/WAYNE [id number]

## 2019-04-12 NOTE — PROGRESS NOTES
BUN 19 04/12/2019    CREATININE 0.5 04/12/2019    GFRAA >60 04/12/2019    LABGLOM >60 04/12/2019    GLUCOSE 109 04/12/2019    PROT 5.8 04/12/2019    LABALBU 2.4 04/12/2019    CALCIUM 8.4 04/12/2019    BILITOT 0.4 04/12/2019    ALKPHOS 76 04/12/2019    AST 14 04/12/2019    ALT 15 04/12/2019       XR CHEST PORTABLE   Final Result   1. After recent left side thoracentesis. 2. No left-sided pneumothorax. US THORACENTESIS   Final Result   As above. CT Chest WO Contrast   Final Result   Development large left pleural effusion and moderate size right   pleural effusion since prior study. New area of significant   atelectasis in the lingula. There are significant areas of bibasilar   atelectasis/infiltrate. Focal masses as was suggested on the prior CT   are not delineated on the current study. Follow-up evaluation in 6 weeks is recommended         XR CHEST PORTABLE   Final Result      Cardiomegaly      Moderate left-sided pleural effusion unchanged from prior study      No evidence of any airspace consolidation or pulmonary venous   congestion             Assessment:        Aspiration pneumonia  Cavitary pneumonia-fungal?  Bilateral pleural effusion-   Dacrocystitis-R eye  GI bleed-resolved. Anticoagulation held for 2 wks. Just on DVT prophlaxis at this time              Active Problems:    GI bleed    Lower GI bleed    Mild protein-calorie malnutrition (HCC)  Resolved Problems:    * No resolved hospital problems. *      Plan:     OK for d/c to ECF. Itraconazole on med rec adjusted per Dr. Jamar Humphreys recommendations on yesterday's note. 200 mg bid X 6 wks.       Electronically signed by Nancy Woo MD on 4/12/2019 at 9:50 AM

## 2019-04-14 LAB
CULTURE EAR OR EYE: ABNORMAL
CULTURE EAR OR EYE: ABNORMAL
GRAM STAIN RESULT: ABNORMAL
ORGANISM: ABNORMAL

## 2019-04-16 ENCOUNTER — HOSPITAL ENCOUNTER (EMERGENCY)
Age: 84
Discharge: HOME | End: 2019-04-16
Attending: EMERGENCY MEDICINE
Payer: MEDICARE

## 2019-04-16 ENCOUNTER — APPOINTMENT (OUTPATIENT)
Dept: GENERAL RADIOLOGY | Age: 84
End: 2019-04-16
Payer: MEDICARE

## 2019-04-16 ENCOUNTER — TELEPHONE (OUTPATIENT)
Dept: OTHER | Facility: CLINIC | Age: 84
End: 2019-04-16

## 2019-04-16 VITALS
TEMPERATURE: 98.4 F | DIASTOLIC BLOOD PRESSURE: 67 MMHG | SYSTOLIC BLOOD PRESSURE: 129 MMHG | OXYGEN SATURATION: 97 % | HEIGHT: 68 IN | RESPIRATION RATE: 18 BRPM | HEART RATE: 68 BPM | WEIGHT: 150 LBS | BODY MASS INDEX: 22.73 KG/M2

## 2019-04-16 DIAGNOSIS — R41.0 CONFUSION: Primary | ICD-10-CM

## 2019-04-16 DIAGNOSIS — H91.93 BILATERAL HEARING LOSS, UNSPECIFIED HEARING LOSS TYPE: ICD-10-CM

## 2019-04-16 LAB
ALBUMIN SERPL-MCNC: 2.2 G/DL (ref 3.5–5.2)
ALP BLD-CCNC: 73 U/L (ref 35–104)
ALT SERPL-CCNC: 11 U/L (ref 0–32)
ANION GAP SERPL CALCULATED.3IONS-SCNC: 9 MMOL/L (ref 7–16)
AST SERPL-CCNC: 11 U/L (ref 0–31)
BASOPHILS ABSOLUTE: 0.01 E9/L (ref 0–0.2)
BASOPHILS RELATIVE PERCENT: 0.1 % (ref 0–2)
BILIRUB SERPL-MCNC: 0.5 MG/DL (ref 0–1.2)
BILIRUBIN URINE: NEGATIVE
BLOOD, URINE: NEGATIVE
BUN BLDV-MCNC: 15 MG/DL (ref 8–23)
CALCIUM SERPL-MCNC: 8.1 MG/DL (ref 8.6–10.2)
CHLORIDE BLD-SCNC: 99 MMOL/L (ref 98–107)
CHP ED QC CHECK: NORMAL
CLARITY: CLEAR
CO2: 32 MMOL/L (ref 22–29)
COLOR: YELLOW
CREAT SERPL-MCNC: 0.5 MG/DL (ref 0.5–1)
EOSINOPHILS ABSOLUTE: 0.03 E9/L (ref 0.05–0.5)
EOSINOPHILS RELATIVE PERCENT: 0.3 % (ref 0–6)
GFR AFRICAN AMERICAN: >60
GFR NON-AFRICAN AMERICAN: >60 ML/MIN/1.73
GLUCOSE BLD-MCNC: 114 MG/DL
GLUCOSE BLD-MCNC: 123 MG/DL (ref 74–99)
GLUCOSE URINE: NEGATIVE MG/DL
HCT VFR BLD CALC: 28.4 % (ref 34–48)
HEMOGLOBIN: 8.3 G/DL (ref 11.5–15.5)
IMMATURE GRANULOCYTES #: 0.13 E9/L
IMMATURE GRANULOCYTES %: 1.1 % (ref 0–5)
KETONES, URINE: NEGATIVE MG/DL
LACTIC ACID: 1.6 MMOL/L (ref 0.5–2.2)
LEUKOCYTE ESTERASE, URINE: NEGATIVE
LYMPHOCYTES ABSOLUTE: 0.87 E9/L (ref 1.5–4)
LYMPHOCYTES RELATIVE PERCENT: 7.4 % (ref 20–42)
MAGNESIUM: 2.2 MG/DL (ref 1.6–2.6)
MCH RBC QN AUTO: 28 PG (ref 26–35)
MCHC RBC AUTO-ENTMCNC: 29.2 % (ref 32–34.5)
MCV RBC AUTO: 95.9 FL (ref 80–99.9)
METER GLUCOSE: 114 MG/DL (ref 74–99)
MONOCYTES ABSOLUTE: 0.61 E9/L (ref 0.1–0.95)
MONOCYTES RELATIVE PERCENT: 5.2 % (ref 2–12)
NEUTROPHILS ABSOLUTE: 10.14 E9/L (ref 1.8–7.3)
NEUTROPHILS RELATIVE PERCENT: 85.9 % (ref 43–80)
NITRITE, URINE: NEGATIVE
PDW BLD-RTO: 18.5 FL (ref 11.5–15)
PH UA: 5.5 (ref 5–9)
PLATELET # BLD: 450 E9/L (ref 130–450)
PMV BLD AUTO: 9.3 FL (ref 7–12)
POTASSIUM REFLEX MAGNESIUM: 3.5 MMOL/L (ref 3.5–5)
PROTEIN UA: NEGATIVE MG/DL
RBC # BLD: 2.96 E12/L (ref 3.5–5.5)
SODIUM BLD-SCNC: 140 MMOL/L (ref 132–146)
SPECIFIC GRAVITY UA: 1.02 (ref 1–1.03)
TOTAL PROTEIN: 5.9 G/DL (ref 6.4–8.3)
TROPONIN: <0.01 NG/ML (ref 0–0.03)
UROBILINOGEN, URINE: 1 E.U./DL
WBC # BLD: 11.8 E9/L (ref 4.5–11.5)

## 2019-04-16 PROCEDURE — 82962 GLUCOSE BLOOD TEST: CPT

## 2019-04-16 PROCEDURE — 83605 ASSAY OF LACTIC ACID: CPT

## 2019-04-16 PROCEDURE — 81003 URINALYSIS AUTO W/O SCOPE: CPT

## 2019-04-16 PROCEDURE — 85025 COMPLETE CBC W/AUTO DIFF WBC: CPT

## 2019-04-16 PROCEDURE — 84484 ASSAY OF TROPONIN QUANT: CPT

## 2019-04-16 PROCEDURE — 87088 URINE BACTERIA CULTURE: CPT

## 2019-04-16 PROCEDURE — 93005 ELECTROCARDIOGRAM TRACING: CPT | Performed by: STUDENT IN AN ORGANIZED HEALTH CARE EDUCATION/TRAINING PROGRAM

## 2019-04-16 PROCEDURE — 71045 X-RAY EXAM CHEST 1 VIEW: CPT

## 2019-04-16 PROCEDURE — 83735 ASSAY OF MAGNESIUM: CPT

## 2019-04-16 PROCEDURE — 80053 COMPREHEN METABOLIC PANEL: CPT

## 2019-04-16 PROCEDURE — 99285 EMERGENCY DEPT VISIT HI MDM: CPT

## 2019-04-16 NOTE — CARE COORDINATION
Social Work/Transition of Care:    Pt in need of transportation back to [facility] they are agreeable to transport.     Electronically signed by [employer] on 5/25/8433 at 4:34 PM

## 2019-04-16 NOTE — ED PROVIDER NOTES
Patient is a 71-year-old female with a history of CAD, Afib and PVD who is sent over from her nursing home for altered mental status and lethargy. They stated that she was partially responsive earlier today. On presentation multiple attempts were made to try and speak to the patient and she would just laugh and say that she couldn't hear me. As if she could read my lips and she just laughed. There is paperwork that states she is DNR/CCA. She randomly told me that all she wants to do is see her grandson that lives in [place] and then she wants to die. That was the extent of our conversation. The history is provided by the patient. Altered Mental Status   Presenting symptoms: partial responsiveness    Severity:  Unable to specify      Review of Systems   Unable to perform ROS: Acuity of condition       Physical Exam   Constitutional: She is oriented to person, place, and time. She appears well-developed and well-nourished. HENT:   Head: Normocephalic and atraumatic. Purulent discharge from right tearduct   Eyes: Pupils are equal, round, and reactive to light. Neck: Normal range of motion. Neck supple. Cardiovascular: Normal rate and regular rhythm. Pulmonary/Chest: Effort normal and breath sounds normal. No respiratory distress. She has no wheezes. She has no rales. Abdominal: Soft. Bowel sounds are normal. There is no tenderness. There is no rebound and no guarding. Musculoskeletal: She exhibits no edema. Neurological: She is alert and oriented to person, place, and time. No cranial nerve deficit. Coordination normal.   Skin: Skin is warm and dry. Nursing note and vitals reviewed. Procedures    MDM  Number of Diagnoses or Management Options  Diagnosis management comments: Unable to get an appropriate history from the patient. Her she did state that she wants to die.  We'll get some basic labs look for signs of acute infection or something that may be causing her current condition but I during the hospital encounter of 04/16/19   CBC Auto Differential   Result Value Ref Range    WBC 11.8 (H) 4.5 - 11.5 E9/L    RBC 2.96 (L) 3.50 - 5.50 E12/L    Hemoglobin 8.3 (L) 11.5 - 15.5 g/dL    Hematocrit 28.4 (L) 34.0 - 48.0 %    MCV 95.9 80.0 - 99.9 fL    MCH 28.0 26.0 - 35.0 pg    MCHC 29.2 (L) 32.0 - 34.5 %    RDW 18.5 (H) 11.5 - 15.0 fL    Platelets 480 309 - 714 E9/L    MPV 9.3 7.0 - 12.0 fL    Neutrophils % 85.9 (H) 43.0 - 80.0 %    Immature Granulocytes % 1.1 0.0 - 5.0 %    Lymphocytes % 7.4 (L) 20.0 - 42.0 %    Monocytes % 5.2 2.0 - 12.0 %    Eosinophils % 0.3 0.0 - 6.0 %    Basophils % 0.1 0.0 - 2.0 %    Neutrophils # 10.14 (H) 1.80 - 7.30 E9/L    Immature Granulocytes # 0.13 E9/L    Lymphocytes # 0.87 (L) 1.50 - 4.00 E9/L    Monocytes # 0.61 0.10 - 0.95 E9/L    Eosinophils # 0.03 (L) 0.05 - 0.50 E9/L    Basophils # 0.01 0.00 - 0.20 E9/L   Comprehensive Metabolic Panel w/ Reflex to MG   Result Value Ref Range    Sodium 140 132 - 146 mmol/L    Potassium reflex Magnesium 3.5 3.5 - 5.0 mmol/L    Chloride 99 98 - 107 mmol/L    CO2 32 (H) 22 - 29 mmol/L    Anion Gap 9 7 - 16 mmol/L    Glucose 123 (H) 74 - 99 mg/dL    BUN 15 8 - 23 mg/dL    CREATININE 0.5 0.5 - 1.0 mg/dL    GFR Non-African American >60 >=60 mL/min/1.73    GFR African American >60     Calcium 8.1 (L) 8.6 - 10.2 mg/dL    Total Protein 5.9 (L) 6.4 - 8.3 g/dL    Alb 2.2 (L) 3.5 - 5.2 g/dL    Total Bilirubin 0.5 0.0 - 1.2 mg/dL    Alkaline Phosphatase 73 35 - 104 U/L    ALT 11 0 - 32 U/L    AST 11 0 - 31 U/L   Troponin   Result Value Ref Range    Troponin <0.01 0.00 - 0.03 ng/mL   Urinalysis, reflex to microscopic   Result Value Ref Range    Color, UA Yellow Straw/Yellow    Clarity, UA Clear Clear    Glucose, Ur Negative Negative mg/dL    Bilirubin Urine Negative Negative    Ketones, Urine Negative Negative mg/dL    Specific Gravity, UA 1.020 1.005 - 1.030    Blood, Urine Negative Negative    pH, UA 5.5 5.0 - 9.0    Protein, UA Negative Negative mg/dL    Urobilinogen, Urine 1.0 <2.0 E.U./dL    Nitrite, Urine Negative Negative    Leukocyte Esterase, Urine Negative Negative   Lactic Acid, Plasma   Result Value Ref Range    Lactic Acid 1.6 0.5 - 2.2 mmol/L   Magnesium   Result Value Ref Range    Magnesium 2.2 1.6 - 2.6 mg/dL   POCT Glucose   Result Value Ref Range    Glucose 114 mg/dL    QC OK? ok    POCT Glucose   Result Value Ref Range    Meter Glucose 114 (H) 74 - 99 mg/dL   EKG 12 Lead   Result Value Ref Range    Ventricular Rate 81 BPM    Atrial Rate 61 BPM    QRS Duration 80 ms    Q-T Interval 378 ms    QTc Calculation (Bazett) 439 ms    R Axis -4 degrees    T Axis 3 degrees       Radiology:  XR CHEST PORTABLE   Final Result   Increase in consolidation/airspace disease in the left   perihilar region since the study of 4/10/2019. Persistent pleural and   parenchymal density at the lung bases, or prominent on the left. Chronic lung changes. Mild vascular prominence, or congestion.             ------------------------- NURSING NOTES AND VITALS REVIEWED ---------------------------  Date / Time Roomed:  4/16/2019 11:14 AM  ED Bed Assignment:  16/16    The nursing notes within the ED encounter and vital signs as below have been reviewed. BP (!) 140/62   Pulse 70   Temp 98.4 °F (36.9 °C) (Oral)   Resp 18   Ht 5' 8\" (1.727 m)   Wt 150 lb (68 kg)   SpO2 98%   BMI 22.81 kg/m²   Oxygen Saturation Interpretation: Normal      ------------------------------------------ PROGRESS NOTES ------------------------------------------  3:34 PM  I have spoken with the patient and discussed todays results, in addition to providing specific details for the plan of care and counseling regarding the diagnosis and prognosis. Their questions are answered at this time and they are agreeable with the plan. I discussed at length with them reasons for immediate return here for re evaluation.  They will followup with their primary care physician by calling their office tomorrow. --------------------------------- ADDITIONAL PROVIDER NOTES ---------------------------------  At this time the patient is without objective evidence of an acute process requiring hospitalization or inpatient management. They have remained hemodynamically stable throughout their entire ED visit and are stable for discharge with outpatient follow-up. The plan has been discussed in detail and they are aware of the specific conditions for emergent return, as well as the importance of follow-up. New Prescriptions    No medications on file       Diagnosis:  1. Confusion    2. Bilateral hearing loss, unspecified hearing loss type        Disposition:  Patient's disposition: Discharge to home  Patient's condition is stable.        Briana Romo,   Resident  04/16/19 2911

## 2019-04-16 NOTE — ED NOTES
Report [facility] [address] [facility]  04/16/19 5609

## 2019-04-16 NOTE — ED NOTES
Bed: 16  Expected date:   Expected time:   Means of arrival:   Comments:  [address] [place] [address] [place], [address] [place] [address]  04/16/19 1119

## 2019-04-17 LAB
EKG ATRIAL RATE: 61 BPM
EKG Q-T INTERVAL: 378 MS
EKG QRS DURATION: 80 MS
EKG QTC CALCULATION (BAZETT): 439 MS
EKG R AXIS: -4 DEGREES
EKG T AXIS: 3 DEGREES
EKG VENTRICULAR RATE: 81 BPM

## 2019-04-18 LAB — URINE CULTURE, ROUTINE: NORMAL

## 2019-05-13 LAB
FUNGUS (MYCOLOGY) CULTURE: NORMAL
FUNGUS STAIN: NORMAL

## 2019-05-28 LAB
AFB CULTURE (MYCOBACTERIA): NORMAL
AFB SMEAR: NORMAL

## 2019-05-29 NOTE — DISCHARGE SUMMARY
Patient ID: Nora Bennett      Patient's PCP: Lannie Favre, MD    Admit Date: 4/4/2019   Discharge Date: 4/12/2019  Admitting Physician: Lannie Favre, MD  Discharge Physician: Esdras Pennington   Discharge Diagnoses:   Patient Active Problem List   Diagnosis Code    Septic arthritis of wrist ([facility] M00.9    Hyponatremia E87.1    Essential hypertension, benign I10    Chondrocalcinosis M11.20    Leukocytosis D72.829    Carotid bruit R09.89    PVD (peripheral vascular disease) with claudication ([facility]) I73.9    Inflammatory polyarthritis ([facility]) M06.4    Lung mass R91.8    Aspiration pneumonia ([facility]) J69.0    GI bleed K92.2    Lower GI bleed K92.2    Mild protein-calorie malnutrition ([facility] E44.1       Hospital Course: This is a 30-year-old woman who was in a  nursing facility, where she was involved in subacute rehab following an  admission in February that was associated with aspiration and cavitary  pneumonia. She had been treated with a prolonged course of Augmentin,  which she has completed. She was also on itraconazole for possible  fungal involvement with some positive fungal markers. She follows with  Infectious Disease as an outpatient. The patient reportedly had been  doing better with improving respiratory status until the morning of admit when  she developed several bouts of bright red blood from her rectum. This  was painless and she reported no abdominal pain, nausea, or vomiting. No  fever or chills. Her hemoglobin on admission was normal, stable. On Eliquis and ASA for history of  peripheral vascular disease and thromboembolectomy. This was held on admit. Seen initiallly by Dr. Tushar Webster regarding hematochezia and ID to manage above stated infections. The feeling was that this may be diverticular and should be self limited. The anticoagulation was NOT felt to be cause of bleeding but likely exacerbating factor.   She did have persistent pulmonary effusions and dyspnea leading to Dr. Uriah Leger consult on 4/10. 450 cc's bloody fluid obtained from Left pleural space, sent for appropriate studies. She also suffered from dacrocystitis R eye during admission. 4/9 consult with Dr. Triston Bright . Jerryl Boyers Warm compress and IV atb per ID were advised. . F/U 2 wks outpatient advised. She was felt stable for d/c and d/c to ECF on 4/12. Physical Exam:  /71   Pulse 94   Temp 98.1 °F (36.7 °C) (Oral)   Resp 18   Ht 5' 4\" (1.626 m)   Wt 134 lb 2 oz (60.8 kg)   SpO2 96%   BMI 23.02 kg/m²   General appearance: alert, appears stated age and cooperative  Head: Normocephalic, without obvious abnormality, atraumatic  Eyes: conjunctivae/corneas clear. PERRL, EOM's intact. Dacrocystitis R eye  Ears: External ears -normal  Nose: No drainage or sinus tenderness. Throat: lips, mucosa, and tongue normal; teeth and gums normal  Neck: no adenopathy, no carotid bruit, no JVD, supple, symmetrical, trachea midline and thyroid not enlarged, symmetric, no tenderness/mass/nodules  Lungs: Diminished BS bilaterally  Heart: regular rate and rhythm, S1, S2 normal, no murmur,   Abdomen: soft, non-tender; bowel sounds normal; no masses,  no organomegaly  Extremities: extremities normal, atraumatic, no cyanosis. 1+ edema  Neurologic: Grossly normal    XR CHEST PORTABLE   Final Result   1. After recent left side thoracentesis. 2. No left-sided pneumothorax. US THORACENTESIS   Final Result   As above. CT Chest WO Contrast   Final Result   Development large left pleural effusion and moderate size right   pleural effusion since prior study. New area of significant   atelectasis in the lingula. There are significant areas of bibasilar   atelectasis/infiltrate. Focal masses as was suggested on the prior CT   are not delineated on the current study.       Follow-up evaluation in 6 weeks is recommended         XR CHEST PORTABLE   Final Result      Cardiomegaly      Moderate left-sided pleural effusion unchanged from prior study      No evidence of any airspace consolidation or pulmonary venous   congestion             Consults: pulmonary/intensive care, ID and general surgery  Treatments: IV hydration and antibiotics: itraconazole  Disposition: SNF  Discharged Condition: Stable  Follow Up: Primary Care Physician in one week  Discharge Medications:   Lorene Mccabe   Home Medication Instructions SUL:[id number]    Printed on:05/29/19 0142   Medication Information                      acetaminophen (TYLENOL) 500 MG tablet  Take 1 tablet by mouth daily             albuterol (PROVENTIL) (2.5 MG/3ML) 0.083% nebulizer solution  Take 2.5 mg by nebulization every 6 hours as needed for Wheezing or Shortness of Breath             bisacodyl (DULCOLAX) 10 MG suppository  Place 10 mg rectally daily as needed for Constipation             Cholecalciferol (VITAMIN D) 2000 units TABS tablet  Take 1 tablet by mouth daily             enoxaparin (LOVENOX) 40 MG/0.4ML injection  Inject 0.4 mLs into the skin daily             fluticasone (FLONASE) 50 MCG/ACT nasal spray  1 spray by Each Nare route daily             guaiFENesin (ROBITUSSIN) 100 MG/5ML syrup  Take 200 mg by mouth every 6 hours as needed for Cough             levothyroxine (SYNTHROID) 50 MCG tablet  Take 1 tablet by mouth daily             magnesium hydroxide (MILK OF MAGNESIA) 400 MG/5ML suspension  Take 30 mLs by mouth daily as needed for Constipation             metoprolol succinate (TOPROL XL) 25 MG extended release tablet  Take 1 tablet by mouth daily             mirtazapine (REMERON) 15 MG tablet  Take 15 mg by mouth nightly             pantoprazole (PROTONIX) 40 MG tablet  Take 1 tablet by mouth every morning (before breakfast)             Sodium Phosphates (FLEET) 7-19 GM/118ML  Place 1 enema rectally once as needed             vitamin B-12 (CYANOCOBALAMIN) 1000 MCG tablet  Take 6,000 mcg by mouth every morning                 Activity: per PT,OT  Diet: cardiac dietWound Care: none needed  Time Spent on discharge is more than 30 minutes discussing plan of care and discharge medications. Active Problems:    GI bleed    Lower GI bleed    Mild protein-calorie malnutrition (HCC)  Resolved Problems:    * No resolved hospital problems.  *      Signed:  Electronically signed by Rhea Severance, MD on 5/29/2019 at 6:41 PM

## 2020-01-07 PROBLEM — D89.89 IGG4 RELATED DISEASE (HCC): Status: ACTIVE | Noted: 2020-01-07
